# Patient Record
Sex: MALE | Race: WHITE | Employment: OTHER | URBAN - METROPOLITAN AREA
[De-identification: names, ages, dates, MRNs, and addresses within clinical notes are randomized per-mention and may not be internally consistent; named-entity substitution may affect disease eponyms.]

---

## 2017-02-14 RX ORDER — LISINOPRIL AND HYDROCHLOROTHIAZIDE 10; 12.5 MG/1; MG/1
TABLET ORAL
Qty: 90 TAB | Refills: 1 | Status: SHIPPED | OUTPATIENT
Start: 2017-02-14 | End: 2017-07-10 | Stop reason: SDUPTHER

## 2017-02-14 RX ORDER — DICLOFENAC SODIUM 75 MG/1
TABLET, DELAYED RELEASE ORAL
Qty: 180 TAB | Refills: 5 | Status: SHIPPED | OUTPATIENT
Start: 2017-02-14 | End: 2017-07-10 | Stop reason: SDUPTHER

## 2017-05-11 ENCOUNTER — OFFICE VISIT (OUTPATIENT)
Dept: FAMILY MEDICINE CLINIC | Age: 72
End: 2017-05-11

## 2017-05-11 VITALS
HEART RATE: 65 BPM | SYSTOLIC BLOOD PRESSURE: 120 MMHG | DIASTOLIC BLOOD PRESSURE: 76 MMHG | OXYGEN SATURATION: 97 % | HEIGHT: 70 IN | RESPIRATION RATE: 16 BRPM | TEMPERATURE: 97.9 F | WEIGHT: 200.4 LBS | BODY MASS INDEX: 28.69 KG/M2

## 2017-05-11 DIAGNOSIS — L98.9 SKIN LESION: ICD-10-CM

## 2017-05-11 DIAGNOSIS — I10 ESSENTIAL HYPERTENSION, BENIGN: Primary | ICD-10-CM

## 2017-05-11 DIAGNOSIS — M10.9 GOUT, UNSPECIFIED CAUSE, UNSPECIFIED CHRONICITY, UNSPECIFIED SITE: ICD-10-CM

## 2017-05-11 RX ORDER — ALLOPURINOL 100 MG/1
100 TABLET ORAL DAILY
Qty: 90 TAB | Refills: 3 | Status: SHIPPED | OUTPATIENT
Start: 2017-05-11 | End: 2018-05-22 | Stop reason: SDUPTHER

## 2017-05-11 NOTE — MR AVS SNAPSHOT
Visit Information Date & Time Provider Department Dept. Phone Encounter #  
 5/11/2017  3:00 PM Haja Nowak MD [facility] [phone] [id number] Follow-up Instructions Return if symptoms worsen or fail to improve. Upcoming Health Maintenance Date Due DTaP/Tdap/Td series (1 - Tdap) 5/4/1965 GLAUCOMA SCREENING Q2Y 10/16/2016 INFLUENZA AGE 9 TO ADULT 8/1/2017 MEDICARE YEARLY EXAM 8/31/2017 COLONOSCOPY 8/10/2021 Allergies as of 5/11/2017  Review Complete On: 5/11/2017 By: Haja Nowak MD  
 No Known Allergies Current Immunizations  Reviewed on 8/30/2016 Name Date Influenza Vaccine 9/25/2013 Pneumococcal Conjugate (PCV-13) 4/27/2015 Pneumococcal Vaccine (Unspecified Type) 2/20/2013 Zoster Vaccine, Live 2/20/2013 Not reviewed this visit You Were Diagnosed With   
  
 Codes Comments Essential hypertension, benign    -  Primary ICD-10-CM: I10 
ICD-9-CM: 401.1 Gout, unspecified cause, unspecified chronicity, unspecified site     ICD-10-CM: M10.9 ICD-9-CM: 274.9 Skin lesion     ICD-10-CM: L98.9 ICD-9-CM: 709.9 Vitals BP Pulse Temp Resp Height(growth percentile) Weight(growth percentile) 120/76 65 97.9 °F (36.6 °C) (Oral) 16 5' 10\" (1.778 m) 200 lb 6.4 oz (90.9 kg) SpO2 BMI Smoking Status 97% 28.75 kg/m2 Never Smoker Vitals History BMI and BSA Data Body Mass Index Body Surface Area 28.75 kg/m 2 2.12 m 2 Preferred Pharmacy Pharmacy Name Phone [address] [facility] [address] [place] [address] [phone] Your Updated Medication List  
  
   
This list is accurate as of: 5/11/17  3:53 PM.  Always use your most recent med list.  
  
  
  
  
 ALEVE 220 mg Cap Generic drug:  naproxen sodium Take  by mouth. allopurinol 100 mg tablet Commonly known as:  Perez Leisure Take 1 Tab by mouth daily. aspirin 81 mg chewable tablet Take 81 mg by mouth daily. diclofenac EC 75 mg EC tablet Commonly known as:  VOLTAREN  
TAKE 1 TABLET TWICE DAILY FOLIC ACID PO Take  by mouth. Qjomoftp-Zpjv-Tbfdqp-Hyalur Ac 664-264-75-2 mg Cap Take  by mouth daily. lisinopril-hydroCHLOROthiazide 10-12.5 mg per tablet Commonly known as:  PRINZIDE, ZESTORETIC  
TAKE 1 TABLET EVERY DAY Prescriptions Sent to Pharmacy Refills  
 allopurinol (ZYLOPRIM) 100 mg tablet 3 Sig: Take 1 Tab by mouth daily. Class: Normal  
 Pharmacy: [facility], [address]  #: [phone] Route: Oral  
  
We Performed the Following REFERRAL TO DERMATOLOGY [REF19 Custom] Follow-up Instructions Return if symptoms worsen or fail to improve. Referral Information Referral ID Referred By Referred To  
  
 [id number] Denice GILMORE NP   
   [address] Phone: [phone] Fax: [phone] Visits Status Start Date End Date 1 New Request 5/11/17 5/11/18 If your referral has a status of pending review or denied, additional information will be sent to support the outcome of this decision. Introducing [facility]! Dear Hannah Laura: Thank you for requesting a [employer] account. Our records indicate that you already have an active [employer] account. You can access your account anytime at [email or web address] Did you know that you can access your hospital and ER discharge instructions at any time in [employer]? You can also review all of your test results from your hospital stay or ER visit. Additional Information If you have questions, please visit the Frequently Asked Questions section of the [employer] website at [email or web address]. Remember, [employer] is NOT to be used for urgent needs.  For medical emergencies, dial 911. Now available from your iPhone and Android! Please provide this summary of care documentation to your next provider. Your primary care clinician is listed as TOMMIE GILMORE. If you have any questions after today's visit, please call [phone].

## 2017-05-11 NOTE — PROGRESS NOTES
Chief Complaint   Patient presents with    Medication Refill     Medication pending    Skin Problem     c/o of several areas located on body patient sun tellez physician to evaluate     1. Have you been to the ER, urgent care clinic since your last visit? Hospitalized since your last visit? No    2. Have you seen or consulted any other health care providers outside of the [address] [place] [address] since your last visit? Include any pap smears or colon screening. No      Chief Complaint   Patient presents with    Medication Refill     Medication pending    Skin Problem     c/o of several areas located on body patient sun tellez physician to evaluate     he is a 67y.o. year old male who presents for evalution. Reviewed PmHx, RxHx, FmHx, SocHx, AllgHx and updated and dated in the chart. Patient Active Problem List    Diagnosis    Advance care planning     Has LW      Male erectile dysfunction    Essential hypertension, benign    Gout       Review of Systems - negative except as listed above in the HPI    Objective:     Vitals:    05/11/17 1542   BP: 120/76   Pulse: 65   Resp: 16   Temp: 97.9 °F (36.6 °C)   TempSrc: Oral   SpO2: 97%   Weight: 200 lb 6.4 oz (90.9 kg)   Height: 5' 10\" (1.778 m)     Physical Examination: General appearance - alert, well appearing, and in no distress  Skin - r shoulder with 1 cm lesion with rolled edges and open sore  Back with several AK's    Assessment/ Plan:   Alfred Spear was seen today for medication refill and skin problem. Diagnoses and all orders for this visit:    Essential hypertension, benign  -at goal    Gout, unspecified cause, unspecified chronicity, unspecified site  -     allopurinol (ZYLOPRIM) 100 mg tablet; Take 1 Tab by mouth daily. Skin lesion  -     REFERRAL TO DERMATOLOGY       Follow-up Disposition:  Return if symptoms worsen or fail to improve. I have discussed the diagnosis with the patient and the intended plan as seen in the above orders.   The patient understands and agrees with the plan. The patient has received an after-visit summary and questions were answered concerning future plans. Medication Side Effects and Warnings were discussed with patient  Patient Labs were reviewed and or requested:  Patient Past Records were reviewed and or requested    Aj Cui M.D. There are no Patient Instructions on file for this visit.

## 2017-06-19 ENCOUNTER — OFFICE VISIT (OUTPATIENT)
Dept: DERMATOLOGY | Facility: AMBULATORY SURGERY CENTER | Age: 72
End: 2017-06-19

## 2017-06-19 ENCOUNTER — HOSPITAL ENCOUNTER (OUTPATIENT)
Dept: LAB | Age: 72
Discharge: HOME | End: 2017-06-19

## 2017-06-19 VITALS
SYSTOLIC BLOOD PRESSURE: 150 MMHG | HEART RATE: 58 BPM | HEIGHT: 70 IN | TEMPERATURE: 97.9 F | BODY MASS INDEX: 27.92 KG/M2 | OXYGEN SATURATION: 99 % | DIASTOLIC BLOOD PRESSURE: 90 MMHG | WEIGHT: 195 LBS

## 2017-06-19 DIAGNOSIS — L82.1 SEBORRHEIC KERATOSES: ICD-10-CM

## 2017-06-19 DIAGNOSIS — D48.5 NEOPLASM OF UNCERTAIN BEHAVIOR OF SKIN OF BACK: ICD-10-CM

## 2017-06-19 DIAGNOSIS — L57.0 ACTINIC KERATOSIS: ICD-10-CM

## 2017-06-19 DIAGNOSIS — L90.5 SCAR CONDITION AND FIBROSIS OF SKIN: ICD-10-CM

## 2017-06-19 DIAGNOSIS — L81.4 LENTIGINES: ICD-10-CM

## 2017-06-19 DIAGNOSIS — D48.5 NEOPLASM OF UNCERTAIN BEHAVIOR OF SKIN OF SHOULDER: Primary | ICD-10-CM

## 2017-06-19 DIAGNOSIS — D48.5 NEOPLASM OF UNCERTAIN BEHAVIOR OF SKIN OF SHOULDER: ICD-10-CM

## 2017-06-19 DIAGNOSIS — D18.01 CHERRY ANGIOMA: ICD-10-CM

## 2017-06-19 DIAGNOSIS — D22.9 MULTIPLE BENIGN NEVI: ICD-10-CM

## 2017-06-19 DIAGNOSIS — Z85.828 HISTORY OF NONMELANOMA SKIN CANCER: ICD-10-CM

## 2017-06-19 PROCEDURE — 88305 TISSUE EXAM BY PATHOLOGIST: CPT | Performed by: NURSE PRACTITIONER

## 2017-06-19 NOTE — PROGRESS NOTES
Name: Sasha Mason       Age: 67 y.o. Date: 6/19/2017    Chief Complaint:   Chief Complaint   Patient presents with    Skin Exam       Subjective:    HPI  Mr. Sasha Mason is a 67 y.o. male who presents as a new patient to [place] [address] for a skin exam.  The patient was referred for this evaluation by Dr. Edna Matias. The patient has had a skin exam in the past (approximately 4-5 yrs ago was his last) and the patient does have current complaints related to his skin. He reports a lesion that will not go away on the right shoulder. He also reports his wife and Dr. Edna Matias have noted lesions on his back. He reports a history of outdoor work and recreation. He reports a history of 8-10 skin cancers treated in the past.  Mr. Sasha Mason is feeling well and in his usual state of health today. The patient's pertinent skin history includes: NMSC treated in the past - non facial he reports. Sun exposure with some sun screen use and now wears a hat. ROS: Constitutional: Negative. Dermatological : positive for - skin lesion changes      Social History     Social History    Marital status: SINGLE     Spouse name: N/A    Number of children: N/A    Years of education: N/A     Occupational History    Not on file. Social History Main Topics    Smoking status: Never Smoker    Smokeless tobacco: Never Used    Alcohol use 1.0 oz/week     1 Glasses of wine, 1 Cans of beer per week      Comment: 2-3 times a week    Drug use: No    Sexual activity: Not on file     Other Topics Concern    Not on file     Social History Narrative       No family history on file.     Past Medical History:   Diagnosis Date    Arthritis     gout    Family history of skin cancer     Gout 11/13/2013    Hypertension     Radiation exposure     Skin cancer     Sun-damaged skin        Past Surgical History:   Procedure Laterality Date    COLONOSCOPY  11/20/2014         HX COLONOSCOPY  2006 2015 1 polyp,     HX HEENT      oral surgery       Current Outpatient Prescriptions   Medication Sig Dispense Refill    allopurinol (ZYLOPRIM) 100 mg tablet Take 1 Tab by mouth daily. 90 Tab 3    diclofenac EC (VOLTAREN) 75 mg EC tablet TAKE 1 TABLET TWICE DAILY 180 Tab 5    lisinopril-hydroCHLOROthiazide (PRINZIDE, ZESTORETIC) 10-12.5 mg per tablet TAKE 1 TABLET EVERY DAY 90 Tab 1    naproxen sodium (ALEVE) 220 mg cap Take  by mouth.  FOLIC ACID PO Take  by mouth.  Ouaccodi-Edaa-Aqmgaj-Hyalur Ac [id number] mg cap Take  by mouth daily.  aspirin 81 mg chewable tablet Take 81 mg by mouth daily. No Known Allergies      Objective:    Visit Vitals    /90 (BP 1 Location: Left arm, BP Patient Position: Sitting)    Pulse (!) 58    Temp 97.9 °F (36.6 °C) (Oral)    Ht 5' 10\" (1.778 m)    Wt 88.5 kg (195 lb)    SpO2 99%    BMI 27.98 kg/m2       Jg Elaine is a 67 y.o. male who appears well and in no distress. He is awake, alert, and oriented. There is no preauricular, submandibular, or cervical lymphadenopathy. A skin examination was performed including his scalp, face (including eyelid), ears, neck, chest, back, abdomen, upper extremities (including digits/nails), lower extremities (mid thigh to toes), breasts ; buttocks and genital skin was not examined. He has lentigines on sun exposed areas and tanned skin on the back of the neck and forearms. There are old acne scars. There is a thin scaled Ak on the right vertex scalp and left nasal dorsum. There are scars noted on the anterior neck and left chest without evidence of lesion recurrence.  There are multiple areas on the shoulders and back concerning for new areas of basal cell carcinoma - a pearly papule 20 x 9 mm on the right shoulder with telangiectasias, a pink scar like area on the right lower back 18 x 9 mm, a pink shiny macule 11 x 8 mm on the mid upper back, 7 x 7 mm on the mid lower back, 9 x 8 mm on the left posterior shoulder, and 9 x 6 mm on the left upper back. Waxy macules and keratotic papules consistent with seborrheic keratoses, non inflamed, as well as cherry angiomas are noted. There are scattered medium brown junctional nevi and intradermal nevi are without concerning features. Assessment/Plan:  1. Neoplasm of Uncertain Behavior, right shoulder, favor BCC. The differential diagnoses were discussed. A shave biopsy was advised to sample this lesion. The procedure was reviewed and verbal and written consent were obtained. The risks of pain, bleeding, infection, and scar were discussed. The patient is aware that this is a sample and is intended for diagnosis and not therapy of the skin lesion. I performed the procedure. The site was cleansed and anesthetized with 1% Lidocaine with Epinephrine 1:100,000. A shave biopsy was performed to sample the lesion. Drysol was used for hemostasis. The wound was bandaged and care reviewed. The specimen was sent to pathology. I will contact the patient with the results and any further treatment that may be necessary. 2. Neoplasm of Uncertain Behavior, right lower back favor BCC. The differential diagnoses were discussed. A shave biopsy was advised to sample this lesion. The procedure was reviewed and verbal and written consent were obtained. The risks of pain, bleeding, infection, and scar were discussed. The patient is aware that this is a sample and is intended for diagnosis and not therapy of the skin lesion. I performed the procedure. The site was cleansed and anesthetized with 1% Lidocaine with Epinephrine 1:100,000. A shave biopsy was performed to sample the lesion. Drysol was used for hemostasis. The wound was bandaged and care reviewed. The specimen was sent to pathology. I will contact the patient with the results and any further treatment that may be necessary. 3. Neoplasm of Uncertain Behavior, mid upper back, favor BCC.   The differential diagnoses were discussed. A shave biopsy followed by curettage was advised to address this lesion with malignant destruction. The procedure was reviewed and verbal and written consent were obtained. The risks of pain, bleeding, infection, scar, and recurrence of the lesion were discussed. I performed the procedure. The site was cleansed and anesthetized with 1% Lidocaine with Epinephrine 1:100,000. Curettage was performed by me to include a 2 mm margin, resulting in a post curettage defect size of 15 x 12 mm. Drysol was used for hemostasis. The wound was bandaged and care reviewed. The specimen was sent to pathology. I will contact the patient with the results and any further treatment that may be necessary. 4. Neoplasm of Uncertain Behavior, mid lower back, favor BCC. The differential diagnoses were discussed. A shave biopsy followed by curettage was advised to address this lesion with malignant destruction. The procedure was reviewed and verbal and written consent were obtained. The risks of pain, bleeding, infection, scar, and recurrence of the lesion were discussed. I performed the procedure. The site was cleansed and anesthetized with 1% Lidocaine with Epinephrine 1:100,000. Curettage was performed by me to include a 2 mm margin, resulting in a post curettage defect size of 11 x 11 mm. Drysol was used for hemostasis. The wound was bandaged and care reviewed. The specimen was sent to pathology. I will contact the patient with the results and any further treatment that may be necessary. 5. Neoplasm of Uncertain Behavior, left posterior shoulder, favor BCC. The differential diagnoses were discussed. A shave biopsy followed by curettage was advised to address this lesion with malignant destruction. The procedure was reviewed and verbal and written consent were obtained. The risks of pain, bleeding, infection, scar, and recurrence of the lesion were discussed.   I performed the procedure. The site was cleansed and anesthetized with 1% Lidocaine with Epinephrine 1:100,000. Curettage was performed by me to include a 2 mm margin, resulting in a post curettage defect size of 13 x 12 mm. Drysol was used for hemostasis. The wound was bandaged and care reviewed. The specimen was sent to pathology. I will contact the patient with the results and any further treatment that may be necessary. 6. Neoplasm of Uncertain Behavior, left upper back, favor BCC. The differential diagnoses were discussed. A shave biopsy followed by curettage was advised to address this lesion with malignant destruction. The procedure was reviewed and verbal and written consent were obtained. The risks of pain, bleeding, infection, scar, and recurrence of the lesion were discussed. I performed the procedure. The site was cleansed and anesthetized with 1% Lidocaine with Epinephrine 1:100,000. Curettage was performed by me to include a 2 mm margin, resulting in a post curettage defect size of 13 x 10 mm. Drysol was used for hemostasis. The wound was bandaged and care reviewed. The specimen was sent to pathology. I will contact the patient with the results and any further treatment that may be necessary. 7. Seborrheic keratoses. The diagnosis was reviewed and the patient was reassured that no treatment is needed for these benign lesions. 8. Normal nevi. The diagnosis of normal nevi was reviewed. I discussed sun protection, sunscreen use, the warning signs of skin cancer, the need for self-skin examinations, and the need for regular practitioner exams every 6 months. The patient should follow up sooner as needed if new, changing, or symptomatic skin lesions arise. 9. Cherry angiomas. The diagnosis was reviewed and the patient was reassured that no treatment is needed for these benign lesions. 10 . Solar lentigos. The diagnosis and relationship to sun exposure was reviewed.   Latricia Hwang protection advised. 11. Actinic Keratoses. The diagnosis of this precancerous lesion related to sun exposure was reviewed. Verbal consent was obtained. I treated 2 lesions with cryotherapy and post-cryotherapy care was reviewed. 12. Personal history of skin cancer. I discussed sun protection, sunscreen use, the warning signs of skin cancer, the need for self-skin examinations, and the need for regular practitioner exams every 6 months. The patient should follow up sooner as needed if new, changing, or symptomatic skin lesions arise. [facility]   OFFICE PROCEDURE PROGRESS NOTE   Chart reviewed for the following:   Morgan LOPEZ, have reviewed the History, Physical and updated the Allergic reactions for Roselind Redder. TIME OUT performed immediately prior to start of procedure:   Clau LOPEZ, [address], have performed the following reviews on Roselind Redder   prior to the start of the procedure:     * Patient was identified by name and date of birth   * Agreement on procedure being performed was verified   * Risks and Benefits explained to the patient   * Procedure site verified and marked as necessary   * Patient was positioned for comfort   * Consent was signed and verified     Time: 7554  Date of procedure: 6/19/2017  Procedure performed by: Che Do  Provider assisted by: lpn   Patient assisted by: self   How tolerated by patient: tolerated the procedure well with no complications   Comments: none

## 2017-06-22 NOTE — PROGRESS NOTES
I notified the patient of the positive results of basal cell carcinoma and all lesions that were biopsied. The lesions were treated with curettage except for the right lower back and right shoulder which will be referred for excision. He understands and states he is doing well. He is ready to schedule these excisions with Dr. Shyanne Lucero.

## 2017-07-11 RX ORDER — DICLOFENAC SODIUM 75 MG/1
TABLET, DELAYED RELEASE ORAL
Qty: 180 TAB | Refills: 5 | Status: SHIPPED | OUTPATIENT
Start: 2017-07-11 | End: 2019-12-17 | Stop reason: ALTCHOICE

## 2017-07-11 RX ORDER — LISINOPRIL AND HYDROCHLOROTHIAZIDE 10; 12.5 MG/1; MG/1
TABLET ORAL
Qty: 90 TAB | Refills: 1 | Status: SHIPPED | OUTPATIENT
Start: 2017-07-11 | End: 2018-05-22 | Stop reason: SDUPTHER

## 2017-08-21 ENCOUNTER — HOSPITAL ENCOUNTER (OUTPATIENT)
Dept: LAB | Age: 72
Discharge: HOME | End: 2017-08-21

## 2017-08-21 ENCOUNTER — OFFICE VISIT (OUTPATIENT)
Dept: DERMATOLOGY | Facility: AMBULATORY SURGERY CENTER | Age: 72
End: 2017-08-21

## 2017-08-21 VITALS
OXYGEN SATURATION: 97 % | HEIGHT: 70 IN | DIASTOLIC BLOOD PRESSURE: 82 MMHG | HEART RATE: 67 BPM | WEIGHT: 195 LBS | TEMPERATURE: 97.6 F | BODY MASS INDEX: 27.92 KG/M2 | SYSTOLIC BLOOD PRESSURE: 118 MMHG

## 2017-08-21 DIAGNOSIS — C44.519 BASAL CELL CARCINOMA OF BACK: ICD-10-CM

## 2017-08-21 DIAGNOSIS — C44.612 BASAL CELL CARCINOMA OF RIGHT SHOULDER: Primary | ICD-10-CM

## 2017-08-21 NOTE — PROGRESS NOTES
Written by Lubna Candelaria, as dictated by Dr. Warden Leonard. Homer Saucedo MD.    CC: Basal cell carcinomas on the right shoulder and right mid back    History of Present Illness:    Toni Iglesias is a 67 y.o. male referred by Collette Salk, [address]. He has two biopsy-proven basal cell carcinomas on on the right shoulder and right mid back. He has an infiltrative and superficial basal cell carcinoma on his right shoulder and an ulcerated and nodular basal cell carcinoma on his right mid back. The basal cell carcinoma on his right mid back was previously designated as the right lower back. These are new basal cell carcinomas present for more than six months with no prior treatment. Biopsies confirmed the diagnoses of basal cell carcinomas, and I reviewed the written pathology. He is feeling well and in his usual state of health today. He has no pain, no current illnesses, no other skin concerns. His allergies, medications, medical, and social history are reviewed by me today. Exam:    He is an awake, alert, and oriented 67 y.o. male who appears well and in no distress.  I examined his right shoulder and back. He has a 22 x 9 mm pearly plaque on his right shoulder. He has a 21 x 10 mm pink plaque with central biopsy scar on his right mid back. He confirms both locations. He has well healed surgical sites where 4 other BCCs were recently curetted. Assessment/Plan:    1. Basal cell carcinoma, right shoulder.     Excision was advised for removal and therapy of this 22 x 9 mm lesion on the right shoulder.  The excision procedure was reviewed and verbal and written consents were obtained.  The risks of pain, bleeding, infection, recurrence of the lesion, and scar were discussed.  I performed the procedure.  The site was cleansed and anesthetized with 1% lidocaine with epinephrine 1:100,000.  The lesion was excised with a 3 mm margin in an elliptical manner to a depth of subcutaneous tissue.  Hemostasis was obtained with electrosurgery and pressure. An intermediate repair was performed after the excision. 4-0 polysorb suture was used for approximation of deep tissues and a second layer of 4-0 polysorb suture was used to approximate the skin edges. The closure length was 42 mm.  The wound was bandaged and care reviewed.  The specimen was sent to pathology. I will contact the patient with the results and any further treatment that may be necessary. 2. Basal cell carcinoma, right mid back. Excision was advised for removal and therapy of this 21 x 10 mm lesion on the right mid back.  The excision procedure was reviewed and verbal and written consents were obtained.  The risks of pain, bleeding, infection, recurrence of the lesion, and scar were discussed.  I performed the procedure.  The site was cleansed and anesthetized with 1% lidocaine with epinephrine 1:100,000.  The lesion was excised with a 3 mm margin in an elliptical manner to a depth of subcutaneous tissue.  Hemostasis was obtained with electrosurgery and pressure. An intermediate was performed after the excision. 4-0 polysorb suture was used for approximation of deep tissues and a second layer of 4-0 polysorb suture was used to approximate the skin edges. The closure length was 43 mm.  The wound was bandaged and care reviewed.  The specimen was sent to pathology. I will contact the patient with the results and any further treatment that may be necessary. The documentation recorded by the scribe accurately reflects the service I personally performed and the decisions made by me. [facility]   OFFICE PROCEDURE PROGRESS NOTE     Chart reviewed for the following:     Ludmila Schneider MD, have reviewed the History, Physical and updated the Allergic reactions for [address] performed immediately prior to start of procedure:     Ludmila Schneider MD, have performed the following reviews on Paloma Walls prior to the start of the procedure:     * Patient was identified by name and date of birth   * Agreement on procedure being performed was verified   * Risks and Benefits explained to the patient   * Procedure site verified and marked as necessary   * Patient was positioned for comfort   * Consent was signed and verified     Time: 3:50 PM   Date of procedure: 8/21/2017  Procedure performed by: Chasity Markham.  Missy Crouch MD   Provider assisted by: LPN   Patient assisted by: self   How tolerated by patient: tolerated the procedure well with no complications   Comments: none

## 2017-08-21 NOTE — MR AVS SNAPSHOT
Visit Information Date & Time Provider Department Dept. Phone Encounter #  
 8/21/2017  3:30 PM Becki Mayberry MD [facility] [phone] [id number] Upcoming Health Maintenance Date Due DTaP/Tdap/Td series (1 - Tdap) 5/4/1965 GLAUCOMA SCREENING Q2Y 10/16/2016 INFLUENZA AGE 9 TO ADULT 8/1/2017 MEDICARE YEARLY EXAM 8/31/2017 COLONOSCOPY 8/10/2021 Allergies as of 8/21/2017  Review Complete On: 8/21/2017 By: Becki Mayberry MD  
 No Known Allergies Current Immunizations  Reviewed on 8/30/2016 Name Date Influenza Vaccine 9/25/2013 Pneumococcal Conjugate (PCV-13) 4/27/2015 Pneumococcal Vaccine (Unspecified Type) 2/20/2013 Zoster Vaccine, Live 2/20/2013 Not reviewed this visit Vitals BP Pulse Temp Height(growth percentile) Weight(growth percentile) SpO2  
 118/82 (BP 1 Location: Left arm, BP Patient Position: Sitting) 67 97.6 °F (36.4 °C) (Oral) 5' 10\" (1.778 m) 195 lb (88.5 kg) 97% BMI Smoking Status 27.98 kg/m2 Never Smoker Vitals History BMI and BSA Data Body Mass Index Body Surface Area  
 27.98 kg/m 2 2.09 m 2 Preferred Pharmacy Pharmacy Name Phone [facility] [address] [facility] [address] [phone] Your Updated Medication List  
  
   
This list is accurate as of: 8/21/17  3:38 PM.  Always use your most recent med list.  
  
  
  
  
 ALEVE 220 mg Cap Generic drug:  naproxen sodium Take  by mouth. allopurinol 100 mg tablet Commonly known as:  Chary Rizwana Take 1 Tab by mouth daily. aspirin 81 mg chewable tablet Take 81 mg by mouth daily. diclofenac EC 75 mg EC tablet Commonly known as:  VOLTAREN  
TAKE 1 TABLET TWICE DAILY FOLIC ACID PO Take  by mouth. Ttfzqeco-Rvto-Iilvxv-Hyalur Ac 081-542-87-2 mg Cap Take  by mouth daily. lisinopril-hydroCHLOROthiazide 10-12.5 mg per tablet Commonly known as:  PRINZIDE, ZESTORETIC  
TAKE 1 TABLET EVERY DAY Patient Instructions WOUND CARE INSTRUCTIONS 1. Keep the dressing clean and dry and do not remove for 48 hours. 2. Then change the dressing once a day as follows: 
a. Wash hands before and after each dressing change. b. Remove dressing and wash site gently with mild soap and water, rinse, and pat dry. 
c. Apply an ointment (Bacitracin, Polysporin, Neosporin, Petroleum jelly or Aquaphor). d. Apply a non-stick (Telfa) dressing or Band-Aid to cover the wound. 3. Watch for: BLEEDING: A small amount of drainage may occur. If bleeding occurs, elevate and rest the surgery site. Apply gauze and steady pressure for 15 minutes. If bleeding continues, call this office. INFECTION: Signs of infection include increased redness, pain, warmth, drainage of pus, and fever. If this occurs, call this office. 4. Special Instructions (follow any that are checked): ·  You have stitches that need to be removed in 0 days ·  Avoid bending at the waist and heavy lifting for two days. ·  Sleep with your head elevated for the next two nights. ·  Rest the surgery site and keep it elevated as much as possible for two days. ·  You may apply an ice-pack for 10-15 minutes every waking hour for the rest of the day. ·  Eat a soft diet and avoid hot food and hot drinks for the rest of the day. ·  Other instructions: Follow up as directed Take Tylenol for pain as needed. Once the site is healed with no remaining bandages or open areas, protect your surgical site and scar from the sun, as this area will be more sensitive. Use a broad spectrum sunscreen SPF 30 or higher daily, and a chemical free product (one containing zinc oxide or titanium dioxide) is a good choice if the area is sensitive.  
 
You may begin to gently massage the surgical site in 2-3 weeks, rubbing in a circular motion along the scar. This can help reduce swelling and thickness of a scar. A scar cream may be used beginnning 1 month after the surgery. If you have any questions or concerns, please call our office Monday through Friday at [phone]. Introducing [facility] SERVICES! Dear Danika Narayan: Thank you for requesting a LOCK8 account. Our records indicate that you already have an active LOCK8 account. You can access your account anytime at [email or web address] Did you know that you can access your hospital and ER discharge instructions at any time in LOCK8? You can also review all of your test results from your hospital stay or ER visit. Additional Information If you have questions, please visit the Frequently Asked Questions section of the LOCK8 website at [email or web address] Remember, LOCK8 is NOT to be used for urgent needs. For medical emergencies, dial 911. Now available from your iPhone and Android! Please provide this summary of care documentation to your next provider. Your primary care clinician is listed as TOMMIE GILMORE. If you have any questions after today's visit, please call [phone].

## 2017-08-21 NOTE — PATIENT INSTRUCTIONS
WOUND CARE INSTRUCTIONS    1. Keep the dressing clean and dry and do not remove for 48 hours. 2. Then change the dressing once a day as follows:  a. Wash hands before and after each dressing change. b. Remove dressing and wash site gently with mild soap and water, rinse, and pat dry.  c. Apply an ointment (Bacitracin, Polysporin, Neosporin, Petroleum jelly or Aquaphor). d. Apply a non-stick (Telfa) dressing or Band-Aid to cover the wound. 3. Watch for:  BLEEDING: A small amount of drainage may occur. If bleeding occurs, elevate and rest the surgery site. Apply gauze and steady pressure for 15 minutes. If bleeding continues, call this office. INFECTION: Signs of infection include increased redness, pain, warmth, drainage of pus, and fever. If this occurs, call this office. 4. Special Instructions (follow any that are checked):  · [] You have stitches that need to be removed in 0 days  · [x] Avoid bending at the waist and heavy lifting for two days. · [x] Sleep with your head elevated for the next two nights. · [x] Rest the surgery site and keep it elevated as much as possible for two days. · [x] You may apply an ice-pack for 10-15 minutes every waking hour for the rest of the day. · [] Eat a soft diet and avoid hot food and hot drinks for the rest of the day. · [] Other instructions: Follow up as directed  Take Tylenol for pain as needed. Once the site is healed with no remaining bandages or open areas, protect your surgical site and scar from the sun, as this area will be more sensitive. Use a broad spectrum sunscreen SPF 30 or higher daily, and a chemical free product (one containing zinc oxide or titanium dioxide) is a good choice if the area is sensitive. You may begin to gently massage the surgical site in 2-3 weeks, rubbing in a circular motion along the scar. This can help reduce swelling and thickness of a scar. A scar cream may be used beginnning 1 month after the surgery. If you have any questions or concerns, please call our office Monday through Friday at [phone].

## 2017-08-25 ENCOUNTER — TELEPHONE (OUTPATIENT)
Dept: DERMATOLOGY | Facility: AMBULATORY SURGERY CENTER | Age: 72
End: 2017-08-25

## 2017-09-14 ENCOUNTER — OFFICE VISIT (OUTPATIENT)
Dept: FAMILY MEDICINE CLINIC | Age: 72
End: 2017-09-14

## 2017-09-14 VITALS
HEIGHT: 70 IN | TEMPERATURE: 97.9 F | DIASTOLIC BLOOD PRESSURE: 82 MMHG | OXYGEN SATURATION: 97 % | RESPIRATION RATE: 20 BRPM | WEIGHT: 199 LBS | HEART RATE: 64 BPM | BODY MASS INDEX: 28.49 KG/M2 | SYSTOLIC BLOOD PRESSURE: 147 MMHG

## 2017-09-14 DIAGNOSIS — Z71.89 ADVANCE CARE PLANNING: ICD-10-CM

## 2017-09-14 DIAGNOSIS — Z00.00 ROUTINE GENERAL MEDICAL EXAMINATION AT A HEALTH CARE FACILITY: Primary | ICD-10-CM

## 2017-09-14 DIAGNOSIS — Z23 ENCOUNTER FOR IMMUNIZATION: ICD-10-CM

## 2017-09-14 DIAGNOSIS — M10.9 GOUT, UNSPECIFIED CAUSE, UNSPECIFIED CHRONICITY, UNSPECIFIED SITE: ICD-10-CM

## 2017-09-14 DIAGNOSIS — I10 ESSENTIAL HYPERTENSION, BENIGN: ICD-10-CM

## 2017-09-14 DIAGNOSIS — Z12.5 PROSTATE CANCER SCREENING: ICD-10-CM

## 2017-09-14 RX ORDER — INDOMETHACIN 50 MG/1
CAPSULE ORAL 3 TIMES DAILY
COMMUNITY
End: 2017-09-14 | Stop reason: SDUPTHER

## 2017-09-14 RX ORDER — INDOMETHACIN 50 MG/1
50 CAPSULE ORAL 3 TIMES DAILY
Qty: 90 CAP | Refills: 2 | Status: SHIPPED | OUTPATIENT
Start: 2017-09-14 | End: 2018-08-06

## 2017-09-14 NOTE — PROGRESS NOTES
Patient here for wellness visit. Needs a flu shot. Med refill. 1. Have you been to the ER, urgent care clinic since your last visit? Hospitalized since your last visit? No    2. Have you seen or consulted any other health care providers outside of the [address] [place] [address] since your last visit? Include any pap smears or colon screening. No       Medicare Wellness Exam:    Chief Complaint   Patient presents with    Annual Wellness Visit    Immunization/Injection     flu shot     he is a 67y.o. year old male who presents for evaluation for their Medicare Wellness Visit. Alicia Luther is completed and assessed=yes  Depression Screen is completed and assessed=yes  Medication list reviewed and adjusted for accuracy=yes  Immunizations reviewed and updated=yes  Health/Preventative Screenings reviewed and updated=yes  ADL Functions reviewed=yes    Patient Active Problem List    Diagnosis    Advance care planning     Has LW      Male erectile dysfunction    Essential hypertension, benign    Gout       Reviewed PmHx, RxHx, FmHx, SocHx, AllgHx and updated and dated in the chart. Review of Systems - negative except as listed above in the HPI    Objective:     Vitals:    09/14/17 1447   BP: 147/82   Pulse: 64   Resp: 20   Temp: 97.9 °F (36.6 °C)   SpO2: 97%   Weight: 199 lb (90.3 kg)   Height: 5' 10\" (1.778 m)     Physical Examination: General appearance - alert, well appearing, and in no distress  Neck - supple, no significant adenopathy  Chest - clear to auscultation, no wheezes, rales or rhonchi, symmetric air entry  Heart - normal rate, regular rhythm, normal S1, S2, no murmurs, rubs, clicks or gallops  Abdomen - soft, nontender, nondistended, no masses or organomegaly  Extremities - peripheral pulses normal, no pedal edema, no clubbing or cyanosis    Assessment/ Plan:   Diagnoses and all orders for this visit:    1.  Routine general medical examination at a health care facility  -     METABOLIC PANEL, COMPREHENSIVE  -     LIPID PANEL  -     CBC WITH AUTOMATED DIFF  -     TSH 3RD GENERATION  -     PROSTATE SPECIFIC AG    2. Encounter for immunization  -     Influenza virus vaccine ([address]) 72 years and older (28547)  -     Administration fee () for Medicare insured patients    3. Advance care planning  -has LW    4. Essential hypertension, benign  -     METABOLIC PANEL, COMPREHENSIVE  -     LIPID PANEL  -better at home    5. Gout, unspecified cause, unspecified chronicity, unspecified site  -     indomethacin (INDOCIN) 50 mg capsule; Take 1 Cap by mouth three (3) times daily. 6. Prostate cancer screening  -     PROSTATE SPECIFIC AG         -Pain evaluation performed in office  -Cognitive Screen performed in office  -Depression Screen, Fall risks (by up and go test)  and ADL functionality were addressed  -Medication list updated and reviewed for any changes   -A comprehensive review of medical issues and a plan was formulated  -End of life planning was addressed with pt   -Health Screenings for preventions were addressed and a plan was formulated  -Shingles Vaccine was recommended  -Discussed with patient cancer risk factors and appropriate screenings for age  -Patient evaluated for colonoscopy and referred if needed per screeing criteria  -Labs from previous visits were discussed with patient   -Discussed with patient diet and exercise and formulated a plan as needed  -An Advanced care plan was developed with the patient.  -Alcohol screening performed and was negative    -Follow-up Disposition: Not on File    I have discussed the diagnosis with the patient and the intended plan as seen in the above orders. The patient understands and agrees with the plan. The patient has received an after-visit summary and questions were answered concerning future plans.      Medication Side Effects and Warnings were discussed with patien  Patient Labs were reviewed and or requested  Patient Past Records were reviewed and or requested    There are no Patient Instructions on file for this visit.       Rosemary Lubin M.D.

## 2017-09-14 NOTE — MR AVS SNAPSHOT
Visit Information Date & Time Provider Department Dept. Phone Encounter #  
 9/14/2017  2:40 PM Manda Cottrell MD [address] [facility] [phone] [id number] Follow-up Instructions Return if symptoms worsen or fail to improve. Your Appointments 3/23/2018 10:30 AM  
Office Visit with EZE Conti 8057 [facility] [facility]) Appt Note: est [facility] [address]  
[address] [place] [address] [address] [facility] [address] Upcoming Health Maintenance Date Due DTaP/Tdap/Td series (1 - Tdap) 5/4/1965 GLAUCOMA SCREENING Q2Y 10/16/2016 INFLUENZA AGE 9 TO ADULT 8/1/2017 MEDICARE YEARLY EXAM 8/31/2017 COLONOSCOPY 8/10/2021 Allergies as of 9/14/2017  Review Complete On: 9/14/2017 By: Manda Cottrell MD  
 No Known Allergies Current Immunizations  Reviewed on 8/30/2016 Name Date Influenza High Dose Vaccine PF  Incomplete Influenza Vaccine 9/25/2013 Pneumococcal Conjugate (PCV-13) 4/27/2015 Pneumococcal Vaccine (Unspecified Type) 2/20/2013 Zoster Vaccine, Live 2/20/2013 Not reviewed this visit You Were Diagnosed With   
  
 Codes Comments Routine general medical examination at a health care facility    -  Primary ICD-10-CM: Z00.00 ICD-9-CM: V70.0 Encounter for immunization     ICD-10-CM: Q92 ICD-9-CM: V03.89 Advance care planning     ICD-10-CM: Z71.89 ICD-9-CM: V65.49 Essential hypertension, benign     ICD-10-CM: I10 
ICD-9-CM: 401.1 Gout, unspecified cause, unspecified chronicity, unspecified site     ICD-10-CM: M10.9 ICD-9-CM: 274.9 Prostate cancer screening     ICD-10-CM: Z12.5 ICD-9-CM: V76.44 Vitals BP Pulse Temp Resp Height(growth percentile) Weight(growth percentile) 147/82 64 97.9 °F (36.6 °C) 20 5' 10\" (1.778 m) 199 lb (90.3 kg) SpO2 BMI Smoking Status 97% 28.55 kg/m2 Never Smoker Vitals History BMI and BSA Data Body Mass Index Body Surface Area 28.55 kg/m 2 2.11 m 2 Preferred Pharmacy Pharmacy Name Phone [facility] [address] [place] [address] - [address] [facility] [address] [phone] Your Updated Medication List  
  
   
This list is accurate as of: 9/14/17  3:11 PM.  Always use your most recent med list.  
  
  
  
  
 ALEVE 220 mg Cap Generic drug:  naproxen sodium Take  by mouth. allopurinol 100 mg tablet Commonly known as:  Evorn Slight Take 1 Tab by mouth daily. aspirin 81 mg chewable tablet Take 81 mg by mouth daily. diclofenac EC 75 mg EC tablet Commonly known as:  VOLTAREN  
TAKE 1 TABLET TWICE DAILY FOLIC ACID PO Take  by mouth. Mrzjntdn-Xtbi-Kmqdro-Hyalur Ac 008-027-77-2 mg Cap Take  by mouth daily. indomethacin 50 mg capsule Commonly known as:  INDOCIN Take 1 Cap by mouth three (3) times daily. lisinopril-hydroCHLOROthiazide 10-12.5 mg per tablet Commonly known as:  PRINZIDE, ZESTORETIC  
TAKE 1 TABLET EVERY DAY Prescriptions Sent to Pharmacy Refills  
 indomethacin (INDOCIN) 50 mg capsule 2 Sig: Take 1 Cap by mouth three (3) times daily. Class: Normal  
 Pharmacy: [address] [place] [address], [facility] Ph #: [phone] Route: Oral  
  
We Performed the Following ADMIN INFLUENZA VIRUS VAC [ HCPCS] CBC WITH AUTOMATED DIFF [22920 CPT(R)] INFLUENZA VIRUS VACCINE, HIGH DOSE SEASONAL, PRESERVATIVE FREE [67491 CPT(R)] LIPID PANEL [39560 CPT(R)] METABOLIC PANEL, COMPREHENSIVE [56255 CPT(R)] PSA, DIAGNOSTIC (PROSTATE SPECIFIC AG) Q582560 CPT(R)] TSH 3RD GENERATION [82312 CPT(R)] Follow-up Instructions Return if symptoms worsen or fail to improve. Introducing [facility] & HEALTH SERVICES! Dear Clem Lay: Thank you for requesting a [employer] account. Our records indicate that you already have an active [employer] account. You can access your account anytime at [email or web address] Did you know that you can access your hospital and ER discharge instructions at any time in [employer]? You can also review all of your test results from your hospital stay or ER visit. Additional Information If you have questions, please visit the Frequently Asked Questions section of the [employer] website at [email or web address] Remember, [employer] is NOT to be used for urgent needs. For medical emergencies, dial 911. Now available from your iPhone and Android! Please provide this summary of care documentation to your next provider. Your primary care clinician is listed as TOMMIE GILMORE. If you have any questions after today's visit, please call [phone].

## 2017-09-14 NOTE — ACP (ADVANCE CARE PLANNING)
I discussed with patient living will and gave a legal form for patient to fill out and bring back to the office.

## 2017-09-16 LAB
ALBUMIN SERPL-MCNC: 4.3 G/DL (ref 3.5–4.8)
ALBUMIN/GLOB SERPL: 1.6 {RATIO} (ref 1.2–2.2)
ALP SERPL-CCNC: 59 IU/L (ref 39–117)
ALT SERPL-CCNC: 22 IU/L (ref 0–44)
AST SERPL-CCNC: 20 IU/L (ref 0–40)
BASOPHILS # BLD AUTO: 0 X10E3/UL (ref 0–0.2)
BASOPHILS NFR BLD AUTO: 0 %
BILIRUB SERPL-MCNC: 0.7 MG/DL (ref 0–1.2)
BUN SERPL-MCNC: 32 MG/DL (ref 8–27)
BUN/CREAT SERPL: 22 (ref 10–24)
CALCIUM SERPL-MCNC: 9.6 MG/DL (ref 8.6–10.2)
CHLORIDE SERPL-SCNC: 103 MMOL/L (ref 96–106)
CHOLEST SERPL-MCNC: 222 MG/DL (ref 100–199)
CO2 SERPL-SCNC: 27 MMOL/L (ref 18–29)
CREAT SERPL-MCNC: 1.46 MG/DL (ref 0.76–1.27)
EOSINOPHIL # BLD AUTO: 0.2 X10E3/UL (ref 0–0.4)
EOSINOPHIL NFR BLD AUTO: 2 %
ERYTHROCYTE [DISTWIDTH] IN BLOOD BY AUTOMATED COUNT: 14.3 % (ref 12.3–15.4)
GLOBULIN SER CALC-MCNC: 2.7 G/DL (ref 1.5–4.5)
GLUCOSE SERPL-MCNC: 87 MG/DL (ref 65–99)
HCT VFR BLD AUTO: 39.9 % (ref 37.5–51)
HDLC SERPL-MCNC: 51 MG/DL
HGB BLD-MCNC: 13.5 G/DL (ref 12.6–17.7)
IMM GRANULOCYTES # BLD: 0 X10E3/UL (ref 0–0.1)
IMM GRANULOCYTES NFR BLD: 0 %
INTERPRETATION, 910389: NORMAL
INTERPRETATION: NORMAL
LDLC SERPL CALC-MCNC: 151 MG/DL (ref 0–99)
LYMPHOCYTES # BLD AUTO: 1.4 X10E3/UL (ref 0.7–3.1)
LYMPHOCYTES NFR BLD AUTO: 19 %
MCH RBC QN AUTO: 32 PG (ref 26.6–33)
MCHC RBC AUTO-ENTMCNC: 33.8 G/DL (ref 31.5–35.7)
MCV RBC AUTO: 95 FL (ref 79–97)
MONOCYTES # BLD AUTO: 0.6 X10E3/UL (ref 0.1–0.9)
MONOCYTES NFR BLD AUTO: 8 %
NEUTROPHILS # BLD AUTO: 5.3 X10E3/UL (ref 1.4–7)
NEUTROPHILS NFR BLD AUTO: 71 %
PDF IMAGE, 910387: NORMAL
PLATELET # BLD AUTO: 240 X10E3/UL (ref 150–379)
POTASSIUM SERPL-SCNC: 5.1 MMOL/L (ref 3.5–5.2)
PROT SERPL-MCNC: 7 G/DL (ref 6–8.5)
PSA SERPL-MCNC: 2.2 NG/ML (ref 0–4)
RBC # BLD AUTO: 4.22 X10E6/UL (ref 4.14–5.8)
SODIUM SERPL-SCNC: 141 MMOL/L (ref 134–144)
TRIGL SERPL-MCNC: 98 MG/DL (ref 0–149)
TSH SERPL DL<=0.005 MIU/L-ACNC: 1.56 UIU/ML (ref 0.45–4.5)
VLDLC SERPL CALC-MCNC: 20 MG/DL (ref 5–40)
WBC # BLD AUTO: 7.5 X10E3/UL (ref 3.4–10.8)

## 2018-01-23 ENCOUNTER — OFFICE VISIT (OUTPATIENT)
Dept: FAMILY MEDICINE CLINIC | Age: 73
End: 2018-01-23

## 2018-01-23 VITALS
RESPIRATION RATE: 20 BRPM | OXYGEN SATURATION: 95 % | HEIGHT: 70 IN | WEIGHT: 200 LBS | TEMPERATURE: 99.5 F | SYSTOLIC BLOOD PRESSURE: 129 MMHG | DIASTOLIC BLOOD PRESSURE: 80 MMHG | HEART RATE: 82 BPM | BODY MASS INDEX: 28.63 KG/M2

## 2018-01-23 DIAGNOSIS — J11.1 INFLUENZA: Primary | ICD-10-CM

## 2018-01-23 DIAGNOSIS — R50.9 FEVER, UNSPECIFIED FEVER CAUSE: ICD-10-CM

## 2018-01-23 DIAGNOSIS — R09.81 NASAL CONGESTION: ICD-10-CM

## 2018-01-23 LAB
FLUAV+FLUBV AG NOSE QL IA.RAPID: NEGATIVE POS/NEG
FLUAV+FLUBV AG NOSE QL IA.RAPID: POSITIVE POS/NEG
VALID INTERNAL CONTROL?: YES

## 2018-01-23 NOTE — PROGRESS NOTES
Patient here for congestion , cold sx. 1. Have you been to the ER, urgent care clinic since your last visit? Hospitalized since your last visit? No    2. Have you seen or consulted any other health care providers outside of the [address] [place] [address] since your last visit? Include any pap smears or colon screening. No     Results for orders placed or performed in visit on 01/23/18   AMB POC PATRICE INFLUENZA A/B TEST   Result Value Ref Range    VALID INTERNAL CONTROL POC Yes     Influenza A Ag POC Positive Negative Pos/Neg    Influenza B Ag POC Negative Negative Pos/Neg       Chief Complaint   Patient presents with    Nasal Congestion     congestion, sore throat, headache , fever, since this am     He is a 67 y.o. male who presents for evalution. Reviewed PmHx, RxHx, FmHx, SocHx, AllgHx and updated and dated in the chart. Patient Active Problem List    Diagnosis    Advance care planning     Has LW      Male erectile dysfunction    Essential hypertension, benign    Gout       Review of Systems - negative except as listed above in the HPI    Objective:     Vitals:    01/23/18 1437   BP: 129/80   Pulse: 82   Resp: 20   Temp: 99.5 °F (37.5 °C)   SpO2: 95%   Weight: 200 lb (90.7 kg)   Height: 5' 10\" (1.778 m)     Physical Examination: General appearance - alert, well appearing, and in no distress  Chest - clear to auscultation, no wheezes, rales or rhonchi, symmetric air entry  Heart - normal rate, regular rhythm, normal S1, S2, no murmurs, rubs, clicks or gallops    Assessment/ Plan:   Diagnoses and all orders for this visit:    1. Influenza  -supp care    2. Fever, unspecified fever cause  -     AMB POC PATRICE INFLUENZA A/B TEST-pos A    3. Nasal congestion  -     AMB POC PATRICE INFLUENZA A/B TEST       Follow-up Disposition:  Return if symptoms worsen or fail to improve. I have discussed the diagnosis with the patient and the intended plan as seen in the above orders.   The patient understands and agrees with the plan. The patient has received an after-visit summary and questions were answered concerning future plans. Medication Side Effects and Warnings were discussed with patient  Patient Labs were reviewed and or requested:  Patient Past Records were reviewed and or requested    Isaias Segovia M.D. There are no Patient Instructions on file for this visit.

## 2018-01-23 NOTE — MR AVS SNAPSHOT
[address] [address] 
[phone] Patient: Eboni Diez MRN: [id number] VZQ:[birth year] Visit Information Date & Time Provider Department Dept. Phone Encounter #  
 1/23/2018  2:20 PM Paris Espinal MD [address] [facility] [phone] [id number] Follow-up Instructions Return if symptoms worsen or fail to improve. Your Appointments 3/23/2018 10:30 AM  
Office Visit with EZE Dasilva 8057 [facility] Sanchez Appt Note: est pt  
 [facility] A [address] [place] [address] Upcoming Health Maintenance Date Due DTaP/Tdap/Td series (1 - Tdap) 5[birth year]4/1966 MEDICARE YEARLY EXAM 9/15/2018 GLAUCOMA SCREENING Q2Y 9/14/2019 COLONOSCOPY 8/10/2021 Allergies as of 1/23/2018  Review Complete On: 1/23/2018 By: Paris Espinal MD  
 No Known Allergies Current Immunizations  Reviewed on 8/30/2016 Name Date Influenza High Dose Vaccine PF 9/14/2017 Influenza Vaccine 9/25/2013 Pneumococcal Conjugate (PCV-13) 4/27/2015 Pneumococcal Vaccine (Unspecified Type) 2/20/2013 Zoster Vaccine, Live 2/20/2013 Not reviewed this visit You Were Diagnosed With   
  
 Codes Comments Influenza    -  Primary ICD-10-CM: J11.1 ICD-9-CM: 692.8 Fever, unspecified fever cause     ICD-10-CM: R50.9 ICD-9-CM: 780.60 Nasal congestion     ICD-10-CM: R09.81 ICD-9-CM: 478.19 Vitals BP Pulse Temp Resp Height(growth percentile) Weight(growth percentile) 129/80 82 99.5 °F (37.5 °C) 20 5' 10\" (1.778 m) 200 lb (90.7 kg) SpO2 BMI Smoking Status 95% 28.7 kg/m2 Never Smoker Vitals History BMI and BSA Data Body Mass Index Body Surface Area 28.7 kg/m 2 2.12 m 2 Preferred Pharmacy Pharmacy Name Phone [address] [phone] Your Updated Medication List  
  
   
This list is accurate as of: 1/23/18  3:07 PM.  Always use your most recent med list.  
  
  
  
  
 ALEVE 220 mg Cap Generic drug:  naproxen sodium Take  by mouth. allopurinol 100 mg tablet Commonly known as:  Wayne Stout Take 1 Tab by mouth daily. aspirin 81 mg chewable tablet Take 81 mg by mouth daily. diclofenac EC 75 mg EC tablet Commonly known as:  VOLTAREN  
TAKE 1 TABLET TWICE DAILY FOLIC ACID PO Take  by mouth. Tmuymqlw-Ugkg-Kqiurl-Hyalur Ac 432-970-70-2 mg Cap Take  by mouth daily. indomethacin 50 mg capsule Commonly known as:  INDOCIN Take 1 Cap by mouth three (3) times daily. lisinopril-hydroCHLOROthiazide 10-12.5 mg per tablet Commonly known as:  PRINZIDE, ZESTORETIC  
TAKE 1 TABLET EVERY DAY We Performed the Following AMB POC PATRICE INFLUENZA A/B TEST [17456 CPT(R)] Follow-up Instructions Return if symptoms worsen or fail to improve. Introducing [facility] & HEALTH SERVICES! Dear Marilynn Nelson: Thank you for requesting a [employer] account. Our records indicate that you already have an active [employer] account. You can access your account anytime at [email or web address] Did you know that you can access your hospital and ER discharge instructions at any time in [employer]? You can also review all of your test results from your hospital stay or ER visit. Additional Information If you have questions, please visit the Frequently Asked Questions section of the [employer] website at [email or web address] Remember, [employer]ISS[employer] is NOT to be used for urgent needs. For medical emergencies, dial 911. Now available from your iPhone and Android! Please provide this summary of care documentation to your next provider. Your primary care clinician is listed as TOMMIE GILMORE. If you have any questions after today's visit, please call [phone].

## 2018-03-08 ENCOUNTER — OFFICE VISIT (OUTPATIENT)
Dept: FAMILY MEDICINE CLINIC | Age: 73
End: 2018-03-08

## 2018-03-08 VITALS
HEART RATE: 63 BPM | TEMPERATURE: 97.9 F | OXYGEN SATURATION: 98 % | RESPIRATION RATE: 18 BRPM | SYSTOLIC BLOOD PRESSURE: 154 MMHG | DIASTOLIC BLOOD PRESSURE: 89 MMHG | WEIGHT: 199.6 LBS | BODY MASS INDEX: 28.58 KG/M2 | HEIGHT: 70 IN

## 2018-03-08 DIAGNOSIS — I10 ESSENTIAL HYPERTENSION, BENIGN: Primary | ICD-10-CM

## 2018-03-08 DIAGNOSIS — J06.9 UPPER RESPIRATORY TRACT INFECTION, UNSPECIFIED TYPE: ICD-10-CM

## 2018-03-08 RX ORDER — LORATADINE 10 MG/1
10 TABLET ORAL DAILY
Qty: 30 TAB | Refills: 11 | Status: SHIPPED | OUTPATIENT
Start: 2018-03-08 | End: 2018-08-22 | Stop reason: SDUPTHER

## 2018-03-08 NOTE — MR AVS SNAPSHOT
[address] [address] 
[phone] Patient: Holly Mendoza MRN: [id number] GWB:[birth year] Visit Information Date & Time Provider Department Dept. Phone Encounter #  
 3/8/2018  3:45 PM Ken Mcqueen MD 5[address] [facility] [phone] [id number] Follow-up Instructions Return if symptoms worsen or fail to improve. Your Appointments 3/29/2018  2:30 PM  
Office Visit with EZE Escobar 8057 [facility]) Appt Note: est pt; skin exam  
 [facility] [address] [facility] [address] [address]  
[address] [facility] [address] [address] Upcoming Health Maintenance Date Due DTaP/Tdap/Td series (1 - Tdap) 5/4/1965 Bone Densitometry (Dexa) Screening 5/4/2009 MEDICARE YEARLY EXAM 9/15/2018 GLAUCOMA SCREENING Q2Y 9/14/2019 COLONOSCOPY 8/10/2021 Allergies as of 3/8/2018  Review Complete On: 3/8/2018 By: Ken Mcqueen MD  
 No Known Allergies Current Immunizations  Reviewed on 8/30/2016 Name Date Influenza High Dose Vaccine PF 9/14/2017 Influenza Vaccine 9/25/2013 Pneumococcal Conjugate (PCV-13) 4/27/2015 Pneumococcal Vaccine (Unspecified Type) 2/20/2013 Zoster Vaccine, Live 2/20/2013 Not reviewed this visit You Were Diagnosed With   
  
 Codes Comments Essential hypertension, benign    -  Primary ICD-10-CM: I10 
ICD-9-CM: 401.1 Upper respiratory tract infection, unspecified type     ICD-10-CM: J06.9 ICD-9-CM: 465.9 Vitals BP Pulse Temp Resp Height(growth percentile) Weight(growth percentile) 154/89 63 97.9 °F (36.6 °C) (Oral) 18 5' 10\" (1.778 m) 199 lb 9.6 oz (90.5 kg) SpO2 BMI Smoking Status 98% 28.64 kg/m2 Never Smoker Vitals History BMI and BSA Data  Body Mass Index Body Surface Area  
 28.64 kg/m 2 2.11 m 2  
  
  
 Preferred Pharmacy Pharmacy Name Phone [address] [phone] Your Updated Medication List  
  
   
This list is accurate as of 3/8/18  4:29 PM.  Always use your most recent med list.  
  
  
  
  
 ALEVE 220 mg Cap Generic drug:  naproxen sodium Take  by mouth. allopurinol 100 mg tablet Commonly known as:  Berna Batty Take 1 Tab by mouth daily. aspirin 81 mg chewable tablet Take 81 mg by mouth daily. diclofenac EC 75 mg EC tablet Commonly known as:  VOLTAREN  
TAKE 1 TABLET TWICE DAILY FOLIC ACID PO Take  by mouth. Iwawmkiv-Jpnr-Emyiro-Hyalur Ac 301-484-26-2 mg Cap Take  by mouth daily. indomethacin 50 mg capsule Commonly known as:  INDOCIN Take 1 Cap by mouth three (3) times daily. lisinopril-hydroCHLOROthiazide 10-12.5 mg per tablet Commonly known as:  PRINZIDE, ZESTORETIC  
TAKE 1 TABLET EVERY DAY  
  
 loratadine 10 mg tablet Commonly known as:  Tiajuana Bile Take 1 Tab by mouth daily for 360 days. Prescriptions Sent to Pharmacy Refills  
 loratadine (CLARITIN) 10 mg tablet 11 Sig: Take 1 Tab by mouth daily for 360 days. Class: Normal  
 Pharmacy: [facility] [address] Ph #: [phone] Route: Oral  
  
Follow-up Instructions Return if symptoms worsen or fail to improve. [facility] & HEALTH SERVICES! Dear hSaan Mancia: Thank you for requesting a [employer] account. Our records indicate that you already have an active [employer] account. You can access your account anytime at [email or web address] Did you know that you can access your hospital and ER discharge instructions at any time in [employer]? You can also review all of your test results from your hospital stay or ER visit. Additional Information If you have questions, please visit the Frequently Asked Questions section of the [employer] website at [email or web address] me [email or web address] Remember, [employer] is NOT to be used for urgent needs. For medical emergencies, dial 911. Now available from your iPhone and Android! Please provide this summary of care documentation to your next provider. Your primary care clinician is listed as TOMMIE GILMORE. If you have any questions after today's visit, please call [phone].

## 2018-03-08 NOTE — PROGRESS NOTES
Chief Complaint   Patient presents with    Cold Symptoms     congestion/drainage-Left ear ringing/roaring    Dizziness     when waking in morning    Hypertension     1. Have you been to the ER, urgent care clinic since your last visit? Hospitalized since your last visit? No    2. Have you seen or consulted any other health care providers outside of the [address] [place] [address] since your last visit? Include any pap smears or colon screening. No        Chief Complaint   Patient presents with    Cold Symptoms     congestion/drainage-Left ear ringing/roaring    Dizziness     when waking in morning    Hypertension     He is a 67 y.o. male who presents for evalution. Reviewed PmHx, RxHx, FmHx, SocHx, AllgHx and updated and dated in the chart. Patient Active Problem List    Diagnosis    Advance care planning     Has LW      Male erectile dysfunction    Essential hypertension, benign    Gout       Review of Systems - negative except as listed above in the HPI    Objective:     Vitals:    03/08/18 1620   BP: 154/89   Pulse: 63   Resp: 18   Temp: 97.9 °F (36.6 °C)   TempSrc: Oral   SpO2: 98%   Weight: 199 lb 9.6 oz (90.5 kg)   Height: 5' 10\" (1.778 m)     Physical Examination: General appearance - alert, well appearing, and in no distress  Nose - normal and patent, no erythema, discharge or polyps  Mouth - mucous membranes moist, pharynx normal without lesions  Neck - supple, no significant adenopathy  Chest - clear to auscultation, no wheezes, rales or rhonchi, symmetric air entry  Heart - normal rate, regular rhythm, normal S1, S2, no murmurs, rubs, clicks or gallops    Assessment/ Plan:   Diagnoses and all orders for this visit:    1. Essential hypertension, benign  -inc with uri  -cont rx    2. Upper respiratory tract infection, unspecified type  -     loratadine (CLARITIN) 10 mg tablet;  Take 1 Tab by mouth daily for 360 days.  -add rx       Follow-up Disposition:  Return if symptoms worsen or fail to improve. I have discussed the diagnosis with the patient and the intended plan as seen in the above orders. The patient understands and agrees with the plan. The patient has received an after-visit summary and questions were answered concerning future plans. Medication Side Effects and Warnings were discussed with patient  Patient Labs were reviewed and or requested:  Patient Past Records were reviewed and or requested    Yessenia Metcalf M.D. There are no Patient Instructions on file for this visit.

## 2018-03-29 ENCOUNTER — HOSPITAL ENCOUNTER (OUTPATIENT)
Dept: LAB | Age: 73
Discharge: HOME | End: 2018-03-29

## 2018-03-29 ENCOUNTER — OFFICE VISIT (OUTPATIENT)
Dept: DERMATOLOGY | Facility: AMBULATORY SURGERY CENTER | Age: 73
End: 2018-03-29

## 2018-03-29 VITALS
BODY MASS INDEX: 28.49 KG/M2 | HEIGHT: 70 IN | DIASTOLIC BLOOD PRESSURE: 80 MMHG | HEART RATE: 79 BPM | WEIGHT: 199 LBS | RESPIRATION RATE: 16 BRPM | OXYGEN SATURATION: 98 % | TEMPERATURE: 98 F | SYSTOLIC BLOOD PRESSURE: 122 MMHG

## 2018-03-29 DIAGNOSIS — D48.5 NEOPLASM OF UNCERTAIN BEHAVIOR OF SKIN OF CHEST: ICD-10-CM

## 2018-03-29 DIAGNOSIS — D48.5 NEOPLASM OF UNCERTAIN BEHAVIOR OF SKIN OF BACK: Primary | ICD-10-CM

## 2018-03-29 DIAGNOSIS — Z85.828 FOLLOW-UP SURVEILLANCE OF SKIN CANCER, ENCOUNTER FOR: ICD-10-CM

## 2018-03-29 DIAGNOSIS — L81.4 LENTIGINES: ICD-10-CM

## 2018-03-29 DIAGNOSIS — Z08 FOLLOW-UP SURVEILLANCE OF SKIN CANCER, ENCOUNTER FOR: ICD-10-CM

## 2018-03-29 DIAGNOSIS — L90.5 SCAR CONDITION AND FIBROSIS OF SKIN: ICD-10-CM

## 2018-03-29 DIAGNOSIS — L82.1 OTHER SEBORRHEIC KERATOSIS: ICD-10-CM

## 2018-03-29 DIAGNOSIS — Z85.828 HISTORY OF NONMELANOMA SKIN CANCER: ICD-10-CM

## 2018-03-29 DIAGNOSIS — D18.01 CHERRY ANGIOMA: ICD-10-CM

## 2018-03-29 NOTE — PROGRESS NOTES
Name: Drea Patrick       Age: 67 y.o. Date: 3/29/2018    Chief Complaint:   Chief Complaint   Patient presents with    Skin Exam       Subjective:    HPI  Mr. Drea Patrick is a 67 y.o. male who presents for a full skin exam.  The patient's last skin exam was in August of 2017 and the patient does have current complaints related to his skin. He notes a brown lesion on the left arm, a larger dark lesion on the left ear and a scaly area on the right hand. The patient's pertinent skin history includes : multiple NMSC    ROS: Constitutional: Negative. Dermatological : positive for - skin lesion changes      Social History     Social History    Marital status: SINGLE     Spouse name: N/A    Number of children: N/A    Years of education: N/A     Occupational History    Not on file. Social History Main Topics    Smoking status: Never Smoker    Smokeless tobacco: Never Used    Alcohol use 1.0 oz/week     1 Glasses of wine, 1 Cans of beer per week      Comment: 2-3 times a week    Drug use: No    Sexual activity: Not on file     Other Topics Concern    Not on file     Social History Narrative       History reviewed. No pertinent family history. Past Medical History:   Diagnosis Date    Arthritis     gout    Family history of skin cancer     Gout 11/13/2013    Hypertension     Radiation exposure     Skin cancer     Sun-damaged skin        Past Surgical History:   Procedure Laterality Date    COLONOSCOPY  11/20/2014         HX COLONOSCOPY  2006 2015 1 polyp,     HX HEENT      oral surgery       Current Outpatient Prescriptions   Medication Sig Dispense Refill    loratadine (CLARITIN) 10 mg tablet Take 1 Tab by mouth daily for 360 days. 30 Tab 11    indomethacin (INDOCIN) 50 mg capsule Take 1 Cap by mouth three (3) times daily.  90 Cap 2    lisinopril-hydroCHLOROthiazide (PRINZIDE, ZESTORETIC) 10-12.5 mg per tablet TAKE 1 TABLET EVERY DAY 90 Tab 1    diclofenac EC (VOLTAREN) 75 mg EC tablet TAKE 1 TABLET TWICE DAILY 180 Tab 5    naproxen sodium (ALEVE) 220 mg cap Take  by mouth.  FOLIC ACID PO Take  by mouth.  Vbwnnbpy-Hksu-Irtrpa-Hyalur Ac 307-322-39-2 mg cap Take  by mouth daily.  aspirin 81 mg chewable tablet Take 81 mg by mouth daily.  allopurinol (ZYLOPRIM) 100 mg tablet Take 1 Tab by mouth daily. 90 Tab 3       No Known Allergies      Objective:    Visit Vitals    /80 (BP 1 Location: Left arm, BP Patient Position: Sitting)    Pulse 79    Temp 98 °F (36.7 °C) (Oral)    Resp 16    Ht 5' 10\" (1.778 m)    Wt 90.3 kg (199 lb)    SpO2 98%    BMI 28.55 kg/m2       Cecil Pacheco is a 67 y.o. male who appears well and in no distress. He is awake, alert, and oriented. There is no preauricular, submandibular, or cervical lymphadenopathy. A skin examination was performed including his scalp, face (including eyelids), ears, neck, chest, back, abdomen, upper extremities (including digits/nails), lower extremities (except feet), breasts. He has lentigines on sun exposed areas and a mild sun tan. There are scattered stuck on waxy macules and keratotic papules consistent with seborrheic keratoses. There are cherry angiomas. There is a venous lake on the left ear - compressible violaceous papule. There are medium and dark brown junctional and a few intradermal nevi without concerning features for severe atypia. There are multiple well healed scars - one concerning for recurrence on the left chest  - shiny pink macule in a linear old scar. There is a pink macule on the left upper back, 5 x 4  Mm in size concerning for BCC. Assessment/Plan:  1. Neoplasm of Uncertain Behavior, left upper back. The differential diagnoses were discussed. Shave biopsy and curettage was advised to address this lesion with malignant destruction. The procedure was reviewed and verbal and written consent were obtained.   The risks of pain, bleeding, infection, scar, and recurrence of the lesion were discussed. I performed the procedure. The site was cleansed and anesthetized with 1% Lidocaine with Epinephrine 1:100,000. Curettage was performed by me to include a 2 mm margin, resulting in a post curettage defect size of 9 x 8 mm. Drysol was used for hemostasis. The wound was bandaged and care reviewed. The specimen was sent to pathology. I will contact the patient with the results and any further treatment that may be necessary. 2. Neoplasm of Uncertain Behavior, left chest.  The differential diagnoses were discussed. A shave biopsy was advised to sample this lesion. The procedure was reviewed and verbal and written consent were obtained. The risks of pain, bleeding, infection, and scar were discussed. The patient is aware that this is a sample and is intended for diagnosis and not therapy of the skin lesion. I performed the procedure. The site was cleansed and anesthetized with 1% Lidocaine with Epinephrine 1:100,000. A shave biopsy was performed to sample the lesion. Drysol was used for hemostasis. The wound was bandaged and care reviewed. The specimen was sent to pathology. I will contact the patient with the results and any further treatment that may be necessary. 3.Solar lentigos. The diagnosis and relationship to sun exposure was reviewed. Sun protection advised. 4.Seborrheic keratoses. The diagnosis was reviewed and the patient was reassured that no treatment is needed for these benign lesions. 5.Cherry angiomas. The diagnosis was reviewed and the patient was reassured that no treatment is needed for these benign lesions. 6. Personal history of skin cancer. I discussed sun protection, sunscreen use, the warning signs of skin cancer, the need for self-skin examinations, and the need for regular practitioner exams every 6 months. The patient should follow up sooner as needed if new, changing, or symptomatic skin lesions arise. 7.Normal nevi. The diagnosis of normal nevi was reviewed. I discussed sun protection, sunscreen use, the warning signs of skin cancer, the need for self-skin examinations, and the need for regular practitioner exams every 6 months. The patient should follow up sooner as needed if new, changing, or symptomatic skin lesions arise. [facility]   OFFICE PROCEDURE PROGRESS NOTE   Chart reviewed for the following:   IMorgan, have reviewed the History, Physical and updated the Allergic reactions for Brianda Best. TIME OUT performed immediately prior to start of procedure:   I, Shelley B. Glenard Primrose, [address], have performed the following reviews on Brianda Best   prior to the start of the procedure:     * Patient was identified by name and date of birth   * Agreement on procedure being performed was verified   * Risks and Benefits explained to the patient   * Procedure site verified and marked as necessary   * Patient was positioned for comfort   * Consent was signed and verified     Time: 4179  Date of procedure: 3/29/2018  Procedure performed by: Harriet Soler.  Anuj Whitley  Provider assisted by: lpn   Patient assisted by: self   How tolerated by patient: tolerated the procedure well with no complications   Comments: none

## 2018-03-29 NOTE — MR AVS SNAPSHOT
[address] [facility] [address] [place] [address] [address] [facility] 
[phone] Patient: Lora Kaur MRN: [id number] KDT:3[birth year] Visit Information Date & Time Provider Department Dept. Phone Encounter #  
 3/29/2018  2:30 PM Halford Fabry, NP [place] [phone] Your Appointments 3/29/2018  2:30 PM  
Office Visit with Halford Fabry, NP [place] [address] [facility] Appt Note: est pt; skin exam  
 [facility] [address] [facility] [address]  
[facility] [address] [facility] [address] Upcoming Health Maintenance Date Due DTaP/Tdap/Td series (1 - Tdap) 5/4/1965 MEDICARE YEARLY EXAM 9/15/2018 GLAUCOMA SCREENING Q2Y 9/14/2019 COLONOSCOPY 8/10/2021 Allergies as of 3/29/2018  Review Complete On: 3/29/2018 By: Jose Harris LPN No Known Allergies Current Immunizations  Reviewed on 8/30/2016 Name Date Influenza High Dose Vaccine PF 9/14/2017 Influenza Vaccine 9/25/2013 Pneumococcal Conjugate (PCV-13) 4/27/2015 Pneumococcal Vaccine (Unspecified Type) 2/20/2013 Zoster Vaccine, Live 2/20/2013 Not reviewed this visit Vitals BP Pulse Temp Resp Height(growth percentile) Weight(growth percentile) 122/80 (BP 1 Location: Left arm, BP Patient Position: Sitting) 79 98 °F (36.7 °C) (Oral) 16 5' 10\" (1.778 m) 199 lb (90.3 kg) SpO2 BMI Smoking Status 98% 28.55 kg/m2 Never Smoker BMI and BSA Data Body Mass Index Body Surface Area 28.55 kg/m 2 2.11 m 2 Preferred Pharmacy Pharmacy Name Phone [address] [phone] Your Updated Medication List  
  
   
This list is accurate as of 3/29/18  2:26 PM.  Always use your most recent med list.  
  
  
  
  
 ALEVE 220 mg Cap Generic drug:  naproxen sodium Take  by mouth. allopurinol 100 mg tablet Commonly known as:  Delona Gauze Take 1 Tab by mouth daily. aspirin 81 mg chewable tablet Take 81 mg by mouth daily. diclofenac EC 75 mg EC tablet Commonly known as:  VOLTAREN  
TAKE 1 TABLET TWICE DAILY FOLIC ACID PO Take  by mouth. Regvfaae-Qoqi-Twhros-Hyalur Ac 200-447-78-2 mg Cap Take  by mouth daily. indomethacin 50 mg capsule Commonly known as:  INDOCIN Take 1 Cap by mouth three (3) times daily. lisinopril-hydroCHLOROthiazide 10-12.5 mg per tablet Commonly known as:  PRINZIDE, ZESTORETIC  
TAKE 1 TABLET EVERY DAY  
  
 loratadine 10 mg tablet Commonly known as:  Walt Ivelisse Take 1 Tab by mouth daily for 360 days. Patient Instructions Self Skin Exam and Sunscreens Early detection and treatment is essential in the treatment of all forms of skin cancer. If caught early, all forms of skin cancer are curable. In addition to your regular visits, you should perform a monthly skin examination. Over time, you become familiar with what is normally found on your skin and can identify new or suspicious spots. One of the screening tools you can use to assess your skin is to follow the ABCDEs: 
 
A= Asymmetry (One half is unlike the other half) B= Border (An irregular, scalloped or poorly defined edge) C= Color (Is varied from one area to another, has shades of tan, brown/ black,       white, red or blue) D= Diameter (Spots larger than 6mm or a pencil eraser) E= Evolving (New spots or one that is changing in size, shape, or color) A follow- up interval will be customized based on your history of skin cancer or level of skin damage and risk factors. In any case, if you notice a suspicious or new spot, an appointment should be arranged between regular visits.  
 
Everyone should use sunscreen and sun-safe practices, which is especially important for those with a personal or family history of skin cancer. Suggestions for this include: 1. Use daily moisturizers containing SPF 30 or higher. 2. Wear long sleeve clothing with UPF ratings and a broad-brimmed hat. 3. Apply sunscreen with SPF 30 or higher to all sun exposed areas if you are going to be in the sun. A broad spectrum UVA/ UVB sunscreen is best.  Dont forget to REAPPLY every two hours or more often if swimming or sweating! 4. Avoid outside activities during peak sun hours, especially in the summer (10am- 2pm). 5. DO NOT use tanning beds. Using sunscreen and sun-safe practices can help reduce the likelihood of developing skin cancer or additional skin cancers in those previously diagnosed. Introducing [facility] & [facility] SERVICES! Dear Lorna Catalan: Thank you for requesting a [employer] account. Our records indicate that you already have an active [employer] account. You can access your account anytime at [email or web address] Did you know that you can access your hospital and ER discharge instructions at any time in [employer]? You can also review all of your test results from your hospital stay or ER visit. Additional Information If you have questions, please visit the Frequently Asked Questions section of the [employer] website at [email or web address] Remember, [employer] is NOT to be used for urgent needs. For medical emergencies, dial 911. Now available from your iPhone and Android! Please provide this summary of care documentation to your next provider. Your primary care clinician is listed as TOMMIE GILMORE. If you have any questions after today's visit, please call [phone].

## 2018-04-02 NOTE — PROGRESS NOTES
Spoke w/ pt to advise diagnosis of BCC on both spots.  recladonna. Mohs on the Left chest. He is scheduling now.

## 2018-05-22 DIAGNOSIS — M10.9 GOUT, UNSPECIFIED CAUSE, UNSPECIFIED CHRONICITY, UNSPECIFIED SITE: ICD-10-CM

## 2018-05-22 RX ORDER — ALLOPURINOL 100 MG/1
100 TABLET ORAL DAILY
Qty: 90 TAB | Refills: 3 | Status: SHIPPED | OUTPATIENT
Start: 2018-05-22 | End: 2019-05-20 | Stop reason: SDUPTHER

## 2018-05-22 RX ORDER — LISINOPRIL AND HYDROCHLOROTHIAZIDE 10; 12.5 MG/1; MG/1
TABLET ORAL
Qty: 90 TAB | Refills: 1 | Status: SHIPPED | OUTPATIENT
Start: 2018-05-22 | End: 2018-08-09 | Stop reason: ALTCHOICE

## 2018-05-30 ENCOUNTER — OFFICE VISIT (OUTPATIENT)
Dept: DERMATOLOGY | Facility: AMBULATORY SURGERY CENTER | Age: 73
End: 2018-05-30

## 2018-05-30 ENCOUNTER — HOSPITAL ENCOUNTER (OUTPATIENT)
Dept: LAB | Age: 73
Discharge: HOME | End: 2018-05-30

## 2018-05-30 VITALS
DIASTOLIC BLOOD PRESSURE: 70 MMHG | WEIGHT: 199 LBS | SYSTOLIC BLOOD PRESSURE: 120 MMHG | OXYGEN SATURATION: 99 % | RESPIRATION RATE: 16 BRPM | HEIGHT: 70 IN | BODY MASS INDEX: 28.49 KG/M2 | HEART RATE: 62 BPM

## 2018-05-30 DIAGNOSIS — D48.5 NEOPLASM OF UNCERTAIN BEHAVIOR OF SKIN OF EAR: ICD-10-CM

## 2018-05-30 DIAGNOSIS — C44.519 BASAL CELL CARCINOMA OF SKIN OF OTHER PART OF TRUNK: ICD-10-CM

## 2018-05-30 DIAGNOSIS — C44.91 RECURRENT BASAL CELL CARCINOMA: Primary | ICD-10-CM

## 2018-05-30 NOTE — PROGRESS NOTES
This note is written by Clau Benítez, as dictated by Edouard Pham. Sera Villanueva MD.    CC: Recurrent basal cell carcinoma on the left chest     History of present illness:     Enmanuel Nugent is a 75 y.o. male referred by Beatriz Dozier, [address]. He has a biopsy-proven superficial basal cell carcinoma on the left chest. This is a recurrent basal cell carcinoma present for an unknown amount of time described as a lesion noticed by Beatriz Dozier DNP with prior treatment of removal in the past. Biopsy confirmed the diagnosis of basal cell carcinoma, and I reviewed the written pathology report. He reports a bothersome lesion on his left antihelix that is raised and dark. He is feeling well and in his usual state of health today. He has no pain, no current illnesses, no other skin concerns. His allergies, medications, medical, and social history are reviewed by me today. Exam:     He is an awake, alert, and oriented 75 y.o. male who appears well and in no distress. There is no preauricular, submandibular, or cervical lymphadenopathy. I examined his left chest and left antihelix. He has a 16 x 8 mm indistinct patch at the midpoint of a scar on his left chest. He confirms location. He has an 8 x 4 mm blue and gray papule on his left antihelix. Assessment/plan:    1. Recurrent basal cell carcinoma, left chest. I discussed the diagnosis of basal cell carcinoma and summarized the pathology report. Mohs surgery is indicated by size, poor definition, recurrence, and histology. The procedure was discussed, verbal and written consent were obtained. I performed the procedure. One stage was required to reach a tumor free plane. The surgical defect was managed with intermediate repair. There were no complications. He will follow up as needed as the site heals. Indications, risks, and options were discussed with Enmanuel Nugent preoperatively.  Risks including, but not limited to: pain, bleeding, infection, tumor recurrence, scarring and damage to motor and/or sensory nerves, were discussed. Lilliam Travis chose Mohs surgery. Lilliam Travis was an acceptable surgery candidate. Lilliam Travis was placed in the appropriate position on the operating table in the Mohs surgery procedure room. The area was prepped and draped in the standard manner. Gentian violet was used to outline the clinical margins of the tumor. Local anesthesia was then obtained. The grossly visible tumor was then removed, an underlying layer was excised and mapped according to the Mohs technique, and the individual specimens examined microscopically. The process was repeated until microscopic examination of the tissue specimens confirmed a tumor-free plane. Hemostasis was obtained with electrosurgery and pressure. The wound was covered between stages with moist saline gauze. The wound management options of second intent healing, layered closure, local flap, and/or full thickness skin graft were discussed. Lilliam Travis understands the aims, risks, alternatives, and possible complications and elects to proceed with an intermediate layered closure. Wound margins were made vertical, edges undermined in the subcutaneous plane, standing cones corrected at both poles followed by layered closure. The wound was closed with buried 4-0 polysorb suture in the subcutis to reduce tension on the skin edges, and skin edges were approximated with 6-0 fast gut suture in the dermis to reduce tension on the epidermis. The final closure length was 36 mm. The wound was bandaged with steristrips, Telfa, gauze and Coverroll. Wound care instructions (written and verbal) and a follow up appointment were given to Lilliam Travis before discharge. Lilliam Travis was discharged in good condition. 2. Neoplasm of Uncertain Behavior, left antihelix. The differential diagnoses were discussed.  Excision was advised for removal and therapy of this 8 x 4 mm lesion on left antihelix. The excision procedure was reviewed and verbal and written consent were obtained. The risks of pain, bleeding, infection, recurrence of the lesion, and scar were discussed. I performed the procedure. The site was cleansed and anesthetized with 1% lidocaine with epinephrine 1:100,000. The lesion was excised to a depth of the subcutaneous tissue. Electrosurgery was used for hemostasis. A simple repair was performed after the excision. 6-0 fast gut suture was used to approximate the skin edges. The closure length was 12 mm. The wound was bandaged and care reviewed. The specimen was sent to pathology. I will contact the patient with the results and any further treatment that may be necessary. 3. History of nonmelanoma skin cancer. I discussed the diagnosis and recommend routine examinations with Edu Patrick DNP for surveillance. The documentation recorded by the scribe accurately reflects the service I personally performed and the decisions made by me. [facility]   OFFICE PROCEDURE PROGRESS NOTE     Chart reviewed for the following:     Hiwot Reyes MD, have reviewed the History, Physical and updated the Allergic reactions for [address] performed immediately prior to start of procedure:     Hiwot Reyes MD, have performed the following reviews on [employer] prior to the start of the procedure:     * Patient was identified by name and date of birth   * Agreement on procedure being performed was verified   * Risks and Benefits explained to the patient   * Procedure site verified and marked as necessary   * Patient was positioned for comfort   * Consent was signed and verified     Time: 10:30 AM   Date of procedure: 5/30/2018  Procedure performed by: Bettina Vinson.  Jenniffer Reyes MD   Provider assisted by: LPN   Patient assisted by: self   How tolerated by patient: tolerated the procedure well with no complications Comments: none

## 2018-05-30 NOTE — MR AVS SNAPSHOT
[address] [facility] [address] [place] [address] [address] [place] [address] 
[phone] Patient: Paddy Do MRN: [id number] [facility]:[birth year] Visit Information Date & Time Provider Department Dept. Phone Encounter #  
 5/30/2018 10:00 AM MD Arnulfo Nuñez [id number] [phone] [id number] Your Appointments 10/2/2018 10:00 AM  
Office Visit with EZE Carlson [address] [place] [address]) Appt Note: 6 month skin exam  
 [facility] [address] [facility] [address]  
[facility] [address] [place] [address] Upcoming Health Maintenance Date Due DTaP/Tdap/Td series (1 - Tdap) 5/4/1966 Influenza Age 5 to Adult 8/1/2018 MEDICARE YEARLY EXAM 9/15/2018 GLAUCOMA SCREENING Q2Y 9/14/2019 COLONOSCOPY 8/10/2021 Allergies as of 5/30/2018  Review Complete On: 5/30/2018 By: Festus Lowe LPN No Known Allergies Current Immunizations  Reviewed on 8/30/2016 Name Date Influenza High Dose Vaccine PF 9/14/2017 Influenza Vaccine 9/25/2013 Pneumococcal Conjugate (PCV-13) 4/27/2015 Pneumococcal Vaccine (Unspecified Type) 2/20/2013 Zoster Vaccine, Live 2/20/2013 Not reviewed this visit You Were Diagnosed With   
  
 Codes Comments Recurrent basal cell carcinoma    -  Primary ICD-10-CM: C44.91 
ICD-9-CM: 173.91 Neoplasm of uncertain behavior of skin of ear     ICD-10-CM: D48.5 ICD-9-CM: 238.2 Vitals BP Pulse Resp Height(growth percentile) Weight(growth percentile) SpO2  
 120/70 (BP 1 Location: Left arm, BP Patient Position: Sitting) 62 16 5' 10\" (1.778 m) 199 lb (90.3 kg) 99% BMI Smoking Status 28.55 kg/m2 Never Smoker BMI and BSA Data Body Mass Index Body Surface Area 28.55 kg/m 2 2.11 m 2 [facility] [phone] Your Updated Medication List  
  
   
This list is accurate as of 5/30/18 11:43 AM.  Always use your most recent med list.  
  
  
  
  
 ALEVE 220 mg Cap Generic drug:  naproxen sodium Take  by mouth. allopurinol 100 mg tablet Commonly known as:  Merrily Schilder Take 1 Tab by mouth daily. aspirin 81 mg chewable tablet Take 81 mg by mouth daily. diclofenac EC 75 mg EC tablet Commonly known as:  VOLTAREN  
TAKE 1 TABLET TWICE DAILY FOLIC ACID PO Take  by mouth. Dccnfiuz-Kmyl-Mpobhw-Hyalur Ac 201-618-84-2 mg Cap Take  by mouth daily. indomethacin 50 mg capsule Commonly known as:  INDOCIN Take 1 Cap by mouth three (3) times daily. lisinopril-hydroCHLOROthiazide 10-12.5 mg per tablet Commonly known as:  PRINZIDE, ZESTORETIC  
TAKE 1 TABLET EVERY DAY  
  
 loratadine 10 mg tablet Commonly known as:  Ligia Fleischer Take 1 Tab by mouth daily for 360 days. Patient Instructions WOUND CARE INSTRUCTIONS 1. Keep the dressing clean and dry and do not remove for 48 hours. 2. Then change the dressing once a day as follows: 
a. Wash hands before and after each dressing change. b. Remove dressing and wash site gently with mild soap and water, rinse, and pat dry. 
c. Apply an ointment (Bacitracin, Polysporin, Neosporin, Petroleum jelly or Aquaphor). d. Apply a non-stick (Telfa) dressing or Band-Aid to cover the wound. Remove pressure bandage on Friday, then wash site gently. Leave steri-strips in place until it naturally begins to peel off, about 4-6 days, then remove. After 7 days, if steri-strips remain, they may be removed. Vaseline may be applied at this point for one week. 3. Watch for: BLEEDING: A small amount of drainage may occur. If bleeding occurs, elevate and rest the surgery site.  Apply gauze and steady pressure for 15 minutes. If bleeding continues, call this office. INFECTION: Signs of infection include increased redness, pain, warmth, drainage of pus, and fever. If this occurs, call this office. 4. Special Instructions (follow any that are checked): 
· [x] You have stitches that DO NOT need to be removed. · [x] Avoid bending at the waist and heavy lifting for two days. · [] Sleep with your head elevated for the next two nights. · [] Rest the surgery site and keep it elevated as much as possible for two days. · [x] You may apply an ice-pack for 10-15 minutes every waking hour for the rest of the day. · [] Eat a soft diet and avoid hot food and hot drinks for the rest of the day. · [] Other instructions: Follow up as directed. Take Tylenol or Ibuprofen for pain as needed. Once the site is healed with no remaining bandages or open areas, protect your surgical site and scar from the sun, as this area will be more sensitive. Use a broad spectrum sunscreen SPF 30 or higher daily, and a chemical free product (one containing zinc oxide or titanium dioxide) is a good choice if the area is sensitive. You may begin to gently massage the surgical site in 2-3 weeks, rubbing in a circular motion along the scar. This can help reduce swelling and thickness of a scar. A scar cream may be used beginnning 1 month after the surgery. If you have any questions or concerns, please call our office Monday through Friday at [phone]. Introducing [facility] & HEALTH SERVICES! Deagulshan Valdeznt: Thank you for requesting a [employer] account. Our records indicate that you already have an active [employer] account. You can access your account anytime at [email or web address] Did you know that you can access your hospital and ER discharge instructions at any time in [employer]? You can also review all of your test results from your hospital stay or ER visit. Additional Information If you have questions, please visit the Frequently Asked Questions section of the [employer] website at [email or web address] Remember, [employer] is NOT to be used for urgent needs. For medical emergencies, dial 911. Now available from your iPhone and Android! Please provide this summary of care documentation to your next provider. Your primary care clinician is listed as TOMMIE GILMORE. If you have any questions after today's visit, please call [phone].

## 2018-05-30 NOTE — PATIENT INSTRUCTIONS
WOUND CARE INSTRUCTIONS    1. Keep the dressing clean and dry and do not remove for 48 hours. 2. Then change the dressing once a day as follows:  a. Wash hands before and after each dressing change. b. Remove dressing and wash site gently with mild soap and water, rinse, and pat dry.  c. Apply an ointment (Bacitracin, Polysporin, Neosporin, Petroleum jelly or Aquaphor). d. Apply a non-stick (Telfa) dressing or Band-Aid to cover the wound. Remove pressure bandage on Friday, then wash site gently. Leave steri-strips in place until it naturally begins to peel off, about 4-6 days, then remove. After 7 days, if steri-strips remain, they may be removed. Vaseline may be applied at this point for one week. 3. Watch for:  BLEEDING: A small amount of drainage may occur. If bleeding occurs, elevate and rest the surgery site. Apply gauze and steady pressure for 15 minutes. If bleeding continues, call this office. INFECTION: Signs of infection include increased redness, pain, warmth, drainage of pus, and fever. If this occurs, call this office. 4. Special Instructions (follow any that are checked):  · [x] You have stitches that DO NOT need to be removed. · [x] Avoid bending at the waist and heavy lifting for two days. · [] Sleep with your head elevated for the next two nights. · [] Rest the surgery site and keep it elevated as much as possible for two days. · [x] You may apply an ice-pack for 10-15 minutes every waking hour for the rest of the day. · [] Eat a soft diet and avoid hot food and hot drinks for the rest of the day. · [] Other instructions: Follow up as directed. Take Tylenol or Ibuprofen for pain as needed. Once the site is healed with no remaining bandages or open areas, protect your surgical site and scar from the sun, as this area will be more sensitive.   Use a broad spectrum sunscreen SPF 30 or higher daily, and a chemical free product (one containing zinc oxide or titanium dioxide) is a good choice if the area is sensitive. You may begin to gently massage the surgical site in 2-3 weeks, rubbing in a circular motion along the scar. This can help reduce swelling and thickness of a scar. A scar cream may be used beginnning 1 month after the surgery. If you have any questions or concerns, please call our office Monday through Friday at [phone].

## 2018-08-06 ENCOUNTER — APPOINTMENT (OUTPATIENT)
Dept: CT IMAGING | Age: 73
End: 2018-08-06
Attending: EMERGENCY MEDICINE
Payer: MEDICARE

## 2018-08-06 ENCOUNTER — APPOINTMENT (OUTPATIENT)
Dept: GENERAL RADIOLOGY | Age: 73
End: 2018-08-06
Attending: EMERGENCY MEDICINE
Payer: MEDICARE

## 2018-08-06 ENCOUNTER — HOSPITAL ENCOUNTER (EMERGENCY)
Age: 73
Discharge: HOME | End: 2018-08-06
Attending: EMERGENCY MEDICINE
Payer: MEDICARE

## 2018-08-06 VITALS
DIASTOLIC BLOOD PRESSURE: 78 MMHG | RESPIRATION RATE: 12 BRPM | OXYGEN SATURATION: 98 % | SYSTOLIC BLOOD PRESSURE: 158 MMHG | HEART RATE: 56 BPM | BODY MASS INDEX: 28.55 KG/M2 | TEMPERATURE: 97.9 F | WEIGHT: 199 LBS

## 2018-08-06 DIAGNOSIS — I16.0 HYPERTENSIVE URGENCY: ICD-10-CM

## 2018-08-06 DIAGNOSIS — I10 ESSENTIAL HYPERTENSION: Primary | ICD-10-CM

## 2018-08-06 DIAGNOSIS — E86.0 DEHYDRATION: ICD-10-CM

## 2018-08-06 DIAGNOSIS — R79.89 ELEVATED SERUM CREATININE: ICD-10-CM

## 2018-08-06 DIAGNOSIS — R42 LIGHT HEADEDNESS: ICD-10-CM

## 2018-08-06 LAB
ALBUMIN SERPL-MCNC: 3.6 G/DL (ref 3.5–5)
ALBUMIN/GLOB SERPL: 1.1 {RATIO} (ref 1.1–2.2)
ALP SERPL-CCNC: 62 U/L (ref 45–117)
ALT SERPL-CCNC: 30 U/L (ref 12–78)
ANION GAP SERPL CALC-SCNC: 4 MMOL/L (ref 5–15)
AST SERPL-CCNC: 24 U/L (ref 15–37)
ATRIAL RATE: 51 BPM
BASOPHILS # BLD: 0 K/UL (ref 0–0.1)
BASOPHILS NFR BLD: 0 % (ref 0–1)
BILIRUB SERPL-MCNC: 0.7 MG/DL (ref 0.2–1)
BUN SERPL-MCNC: 27 MG/DL (ref 6–20)
BUN/CREAT SERPL: 20 (ref 12–20)
CALCIUM SERPL-MCNC: 8.7 MG/DL (ref 8.5–10.1)
CALCULATED P AXIS, ECG09: 46 DEGREES
CALCULATED R AXIS, ECG10: -27 DEGREES
CALCULATED T AXIS, ECG11: 41 DEGREES
CHLORIDE SERPL-SCNC: 104 MMOL/L (ref 97–108)
CO2 SERPL-SCNC: 30 MMOL/L (ref 21–32)
CREAT SERPL-MCNC: 1.33 MG/DL (ref 0.7–1.3)
DIAGNOSIS, 93000: NORMAL
DIFFERENTIAL METHOD BLD: ABNORMAL
EOSINOPHIL # BLD: 0.1 K/UL (ref 0–0.4)
EOSINOPHIL NFR BLD: 1 % (ref 0–7)
ERYTHROCYTE [DISTWIDTH] IN BLOOD BY AUTOMATED COUNT: 13 % (ref 11.5–14.5)
GLOBULIN SER CALC-MCNC: 3.4 G/DL (ref 2–4)
GLUCOSE SERPL-MCNC: 106 MG/DL (ref 65–100)
HCT VFR BLD AUTO: 41.3 % (ref 36.6–50.3)
HGB BLD-MCNC: 14.1 G/DL (ref 12.1–17)
IMM GRANULOCYTES # BLD: 0 K/UL (ref 0–0.04)
IMM GRANULOCYTES NFR BLD AUTO: 0 % (ref 0–0.5)
LYMPHOCYTES # BLD: 1.1 K/UL (ref 0.8–3.5)
LYMPHOCYTES NFR BLD: 11 % (ref 12–49)
MCH RBC QN AUTO: 31.8 PG (ref 26–34)
MCHC RBC AUTO-ENTMCNC: 34.1 G/DL (ref 30–36.5)
MCV RBC AUTO: 93 FL (ref 80–99)
MONOCYTES # BLD: 0.5 K/UL (ref 0–1)
MONOCYTES NFR BLD: 5 % (ref 5–13)
NEUTS SEG # BLD: 7.9 K/UL (ref 1.8–8)
NEUTS SEG NFR BLD: 82 % (ref 32–75)
NRBC # BLD: 0 K/UL (ref 0–0.01)
NRBC BLD-RTO: 0 PER 100 WBC
P-R INTERVAL, ECG05: 170 MS
PLATELET # BLD AUTO: 188 K/UL (ref 150–400)
PMV BLD AUTO: 9.6 FL (ref 8.9–12.9)
POTASSIUM SERPL-SCNC: 4.6 MMOL/L (ref 3.5–5.1)
PROT SERPL-MCNC: 7 G/DL (ref 6.4–8.2)
Q-T INTERVAL, ECG07: 422 MS
QRS DURATION, ECG06: 94 MS
QTC CALCULATION (BEZET), ECG08: 388 MS
RBC # BLD AUTO: 4.44 M/UL (ref 4.1–5.7)
SODIUM SERPL-SCNC: 138 MMOL/L (ref 136–145)
TROPONIN I BLD-MCNC: <0.04 NG/ML (ref 0–0.08)
TROPONIN I SERPL-MCNC: <0.05 NG/ML
VENTRICULAR RATE, ECG03: 51 BPM
WBC # BLD AUTO: 9.7 K/UL (ref 4.1–11.1)

## 2018-08-06 PROCEDURE — 93005 ELECTROCARDIOGRAM TRACING: CPT

## 2018-08-06 PROCEDURE — 96361 HYDRATE IV INFUSION ADD-ON: CPT

## 2018-08-06 PROCEDURE — 70450 CT HEAD/BRAIN W/O DYE: CPT

## 2018-08-06 PROCEDURE — 71046 X-RAY EXAM CHEST 2 VIEWS: CPT

## 2018-08-06 PROCEDURE — 74011250636 HC RX REV CODE- 250/636: Performed by: EMERGENCY MEDICINE

## 2018-08-06 PROCEDURE — 74011000250 HC RX REV CODE- 250: Performed by: EMERGENCY MEDICINE

## 2018-08-06 PROCEDURE — 84484 ASSAY OF TROPONIN QUANT: CPT | Performed by: EMERGENCY MEDICINE

## 2018-08-06 PROCEDURE — 85025 COMPLETE CBC W/AUTO DIFF WBC: CPT | Performed by: EMERGENCY MEDICINE

## 2018-08-06 PROCEDURE — 99285 EMERGENCY DEPT VISIT HI MDM: CPT

## 2018-08-06 PROCEDURE — 80053 COMPREHEN METABOLIC PANEL: CPT | Performed by: EMERGENCY MEDICINE

## 2018-08-06 PROCEDURE — 96374 THER/PROPH/DIAG INJ IV PUSH: CPT

## 2018-08-06 RX ORDER — FOLIC ACID 1 MG/1
1 TABLET ORAL DAILY
COMMUNITY

## 2018-08-06 RX ORDER — LABETALOL HYDROCHLORIDE 5 MG/ML
20 INJECTION, SOLUTION INTRAVENOUS
Status: COMPLETED | OUTPATIENT
Start: 2018-08-06 | End: 2018-08-06

## 2018-08-06 RX ORDER — GLUCOSAMINE/CHONDR SU A SOD 750-600 MG
1 TABLET ORAL DAILY
COMMUNITY

## 2018-08-06 RX ORDER — TROLAMINE SALICYLATE 10 G/100G
CREAM TOPICAL
COMMUNITY
End: 2019-12-17 | Stop reason: ALTCHOICE

## 2018-08-06 RX ADMIN — SODIUM CHLORIDE 500 ML: 900 INJECTION, SOLUTION INTRAVENOUS at 11:48

## 2018-08-06 RX ADMIN — LABETALOL HYDROCHLORIDE 20 MG: 5 INJECTION INTRAVENOUS at 11:44

## 2018-08-06 NOTE — ED NOTES
Bedside and Verbal shift change report given to Ambrose PEREZ (oncoming nurse) by Sharmaine Carrel (offgoing nurse). Report included the following information SBAR, ED Summary, MAR, Recent Results and Cardiac Rhythm Sinus Bernard Sinks '.

## 2018-08-06 NOTE — PROGRESS NOTES
[facility]: MED RECONCILIATION    Comments/Recommendations:    Patient reports compliance with most medications except his BP medication (lisinopril/HCTZ). He states that he ran out of medication (out of refills also) about a month ago. BP on presentation was 201/99 with a HR of 54. Patient stated that he has an appointment with Dr. Etta Jeff in September, but he will likely need a supplemental refill to get him through until then. Medications added:     · Topical analgesic cream (recently prescribed on 7/30)    Medications removed:    · Naproxen - uses diclofenac now    Medications adjusted:    · none    Information obtained from: patient, wife, Rx query    Significant PMH/Disease States:   Past Medical History:   Diagnosis Date    Arthritis     gout    Family history of skin cancer     Gout 11/13/2013    Hypertension     Radiation exposure     Skin cancer     Sun-damaged skin      Chief Complaint for this Admission:   Chief Complaint   Patient presents with    Dizziness     Allergies: Review of patient's allergies indicates no known allergies. Prior to Admission Medications:     Medication Documentation Review Audit       Reviewed by Dami Brannon PHARMD (Pharmacist) on 08/06/18 at 1115         Medication Sig Documenting Provider Last Dose Status Taking?      allopurinol (ZYLOPRIM) 100 mg tablet Take 1 Tab by mouth daily. Phyllis Collins MD 8/6/2018 am Active Yes    aspirin 81 mg chewable tablet Take 81 mg by mouth daily. Historical Provider 8/6/2018 am Active Yes    diclofenac EC (VOLTAREN) 75 mg EC tablet TAKE 1 TABLET TWICE DAILY Dilip Tamayo DO 8/6/2018 am Active Yes    folic acid (FOLVITE) 1 mg tablet Take 1 mg by mouth daily. Historical Provider 8/6/2018 am Active Yes    glucosamine/chondr bonner A sod (GLUCOSAMINE-CHONDROITIN) 750-600 mg tab Take 1 Tab by mouth daily.  Historical Provider 8/6/2018 am Active Yes    lisinopril-hydroCHLOROthiazide (PRINZIDE, ZESTORETIC) 10-12.5 mg per tablet TAKE 1 TABLET EVERY DAY Remi De La Rosa MD 7/6/2018 Unknown time Active Yes             Med Note ([facility] Aug 6, 2018 11:14 AM): Ran out of refills about a month ago. Has an appt with Dr. Nandini Forde in September but will need supplemental refill until then.      loratadine (CLARITIN) 10 mg tablet Take 1 Tab by mouth daily for 360 days. Remi De La Rosa MD 8/6/2018 am Active Yes    trolamine salicylate (ANALGESIC, TROLAMINE SALICYL,) 10 % topical cream Apply  to affected area daily as needed for Pain. Historical Provider 8/5/2018 Unknown time Active Yes                  Thank you for the consult,  Morgan Worrell [facility]

## 2018-08-06 NOTE — ED PROVIDER NOTES
HPI Comments: 75 y.o. male with past medical history significant for HTN, gout, skin cancer, and arthritis who presents to the ED with chief complaint of dizziness. Pt reports he was getting out of his car to go golfing this morning at about 0730 when he began feeling dizzy. Pt states his dizziness got progressively worse to the point he was unable to ambulate so he went to the clubhouse to lay down. Pt states his dizziness is accompanied by nausea, says he has generalized weakness and feels \"woozy. \" Pt states his dizziness is exacerbated by position changes. Pt denies feeling a spinning sensation. Pt denies any alleviating factors. Pt states he has been having intermittent dizzy spells recently that resolve on their own after about 15 minutes. Pt states his dizziness today feels similar to his previous dizzy spells but says his sx today were worse and have been persistent. Pt denies any recent falls or hitting his head. Pt states he stopped taking his anti-hypertensive medication about a month ago because he thought that his dizziness may have been due to a side effect. Pt denies hx of stroke, MI, or DVT/PE. Pt denies chest pain, unilateral weakness, numbness, speech difficulty, or syncope. There are no other acute medical complaints voiced at this time. Social Hx: Never smoker. Uses EtOH. . PCP: Guerrero Guerra MD    Note written by Johanny Burns, as dictated by Didier Salguero DO 11:02 AM     The history is provided by the patient and the spouse.         Past Medical History:   Diagnosis Date    Arthritis     gout    Family history of skin cancer     Gout 11/13/2013    Hypertension     Radiation exposure     Skin cancer     Sun-damaged skin        Past Surgical History:   Procedure Laterality Date    COLONOSCOPY  11/20/2014         HX COLONOSCOPY  2006 2015 1 polyp,     HX HEENT      oral surgery    HX MOHS PROCEDURES  05/30/2018    Recurrent BCC L chest by Dr. Ty Johnson No family history on file. Social History     Social History    Marital status: SINGLE     Spouse name: N/A    Number of children: N/A    Years of education: N/A     Occupational History    Not on file. Social History Main Topics    Smoking status: Never Smoker    Smokeless tobacco: Never Used    Alcohol use 1.0 oz/week     1 Glasses of wine, 1 Cans of beer per week      Comment: 2-3 times a week    Drug use: No    Sexual activity: Not on file     Other Topics Concern    Not on file     Social History Narrative         ALLERGIES: Review of patient's allergies indicates no known allergies. Review of Systems   Constitutional: Negative for activity change, appetite change, chills and fever. HENT: Negative for congestion, rhinorrhea, sinus pain, sneezing and sore throat. Eyes: Negative for photophobia and visual disturbance. Respiratory: Negative for cough and shortness of breath. Cardiovascular: Negative for chest pain. Gastrointestinal: Positive for nausea. Negative for abdominal pain, blood in stool, constipation, diarrhea and vomiting. Genitourinary: Negative for difficulty urinating, dysuria, flank pain, hematuria, penile pain and testicular pain. Musculoskeletal: Negative for arthralgias, back pain, myalgias and neck pain. Skin: Negative for rash and wound. Neurological: Positive for dizziness and weakness (generalized). Negative for syncope, speech difficulty, numbness and headaches. Psychiatric/Behavioral: Negative for self-injury and suicidal ideas. All other systems reviewed and are negative. Vitals:    08/06/18 1214 08/06/18 1215 08/06/18 1315 08/06/18 1400   BP:  173/83 167/80 158/78   Pulse: (!) 53 (!) 50 (!) 54 (!) 56   Resp: 11 (!) 7 12 12   Temp:       SpO2: 99%  98% 98%   Weight:                Physical Exam   Constitutional: He is oriented to person, place, and time. He appears well-developed and well-nourished. No distress. Well appearing.   No acute distress. HENT:   Head: Normocephalic and atraumatic. Mouth/Throat: Oropharynx is clear and moist.   Mucous membranes moist.    Eyes: Conjunctivae and EOM are normal. Pupils are equal, round, and reactive to light. Neck: Neck supple. Cardiovascular: Regular rhythm and normal heart sounds. No murmur heard. Slightly bradycardic. Pulmonary/Chest: Effort normal and breath sounds normal. He has no wheezes. He has no rhonchi. He has no rales. Abdominal: Soft. He exhibits no distension. There is no tenderness. Musculoskeletal: He exhibits no edema. Neurological: He is alert and oriented to person, place, and time. Cranial nerves intact. No dysarthria or facial droop. 5/5 strength in all 4 extremities with intact sensation. Intact finger to nose. Negative pronator drift. Normal heel to juárez. Negative Romberg's. Normal gait. Skin: Skin is warm and dry. He is not diaphoretic. Nursing note and vitals reviewed. Note written by Johanny Rojas, as dictated by No att. providers found 11:03 AM    MDM  75 y.o. male presents with noncompliance with his rx'd BP medication regimen with episodic dizziness sensation that seemed to be orthostatic in nature. No syncope, or falls. No chest pain, palpitations, though his sx do seem to be more associated with exertion. Exam significant for markedly elevated BP at 215/97, as well as mild bradycardia with rates in the mid 50's. AF, satting normally. No focal neuro deficits noted, reassuring physical exam with NAD noted. CXR neg for acute process. CT head negative     Labs were drawn and returned showing no leukocytosis, nor anemia, negative troponin x2, normal LFTs, but does show slightly elevated Cr at 1.33 and BUN 27.  Feel that this could be associated with dehydration, especially since he was out on the golf course in the summer heat during this episode, but also could be directly associated with HTN urgency/emergency. Given IV fluid bolus to rehydrate as well as IV labetalol, which significantly improved his blood pressure to the 502'X systolic. He denies any further symptoms, and was able to ambulate several times to the bathroom in the ED without difficulty. His labs were discussed with the patient and his wife at the bedside regarding admission for hydration and monitoring of his renal function and controlling his BP, vs discharge home with close outpatient f/u. Pt requests close outpatient follow up and states that he will likely be able to see his doctor within the next day or two to further discuss his sx and his blood pressure regimen. The importance of this was stressed strongly, encouraged PO hydration, and strict return precautions were given for worsening or concerns. Feel that he is safe for close outpatient follow up at this time. This was discussed with the pt and his wife at the bedside and they stated both understanding and agreement. ED Course       Procedures    ED EKG interpretation:  Rhythm: sinus bradycardia; and regular . Rate (approx.): 51; Axis: normal; ST/T wave: No ST or T wave abnormalities suggestive of ischemia.       Note written by Johanny Jimenez, as dictated by Kyaw Platt DO 11:41 AM

## 2018-08-09 ENCOUNTER — OFFICE VISIT (OUTPATIENT)
Dept: FAMILY MEDICINE CLINIC | Age: 73
End: 2018-08-09

## 2018-08-09 VITALS
HEART RATE: 80 BPM | TEMPERATURE: 98.3 F | RESPIRATION RATE: 17 BRPM | SYSTOLIC BLOOD PRESSURE: 153 MMHG | BODY MASS INDEX: 28.52 KG/M2 | WEIGHT: 199.2 LBS | OXYGEN SATURATION: 97 % | HEIGHT: 70 IN | DIASTOLIC BLOOD PRESSURE: 99 MMHG

## 2018-08-09 DIAGNOSIS — I10 ESSENTIAL HYPERTENSION, BENIGN: Primary | ICD-10-CM

## 2018-08-09 RX ORDER — LISINOPRIL 20 MG/1
20 TABLET ORAL DAILY
Qty: 90 TAB | Refills: 3 | Status: SHIPPED | OUTPATIENT
Start: 2018-08-09 | End: 2019-08-13 | Stop reason: SDUPTHER

## 2018-08-09 RX ORDER — LISINOPRIL 20 MG/1
20 TABLET ORAL DAILY
Qty: 30 TAB | Refills: 0 | Status: SHIPPED | OUTPATIENT
Start: 2018-08-09 | End: 2018-08-09 | Stop reason: SDUPTHER

## 2018-08-09 NOTE — PROGRESS NOTES
Chief Complaint   Patient presents with   [address] [place] [address] ED Follow-up     8/6/18 @ [facility] Dx Dehydration and elevated b/p     Pt seen in the office today for a Ed follow up. Pt also requesting medication refill. He states Marylou  has not sent his b/p rx as of yet  Pt is concerned with why he is dehydrated . He states he consumed Gatorade  and water daily  He also states he has been having bilateral ankle swelling this summer. Pt has been playing 3-4 rounds of golf a week. Pt was diagnosed with dehydration and elevated kidney function at the ER    Subjective: (As above and below)     Chief Complaint   Patient presents with   [address] [place] [address] ED Follow-up     8/6/18 @ [facility] Dx Dehydration and elevated b/p     he is a 75y.o. year old male who presents for evaluation. Reviewed PmHx, RxHx, FmHx, SocHx, AllgHx and updated in chart. Review of Systems - negative except as listed above    Objective:     Vitals:    08/09/18 1337   BP: (!) 153/99   Pulse: 80   Resp: 17   Temp: 98.3 °F (36.8 °C)   TempSrc: Oral   SpO2: 97%   Weight: 199 lb 3.2 oz (90.4 kg)   Height: 5' 10\" (1.778 m)     Physical Examination: General appearance - alert, well appearing, and in no distress  Mental status - normal mood, behavior, speech, dress, motor activity, and thought processes  Mouth - mucous membranes moist, pharynx normal without lesions  Chest - clear to auscultation, no wheezes, rales or rhonchi, symmetric air entry  Heart - normal rate, regular rhythm, normal S1, S2, no murmurs, rubs, clicks or gallops  Musculoskeletal - no joint tenderness, deformity or swelling  Extremities - peripheral pulses normal, no pedal edema, no clubbing or cyanosis    Assessment/ Plan:   1.  Essential hypertension, benign  -remove HCTZ from BP medication  -increase lisinopril to compensate  -prescriptions sent to local and mail order pharmacy  -recheck labs      Follow-up Disposition: As needed  I have discussed the diagnosis with the patient and the intended plan as seen in the above orders. The patient has received an after-visit summary and questions were answered concerning future plans.      Medication Side Effects and Warnings were discussed with patient: yes  Patient Labs were reviewed: yes  Patient Past Records were reviewed:  yes    Chalino Sherman M.D.

## 2018-08-09 NOTE — MR AVS SNAPSHOT
[address] [address] 
[phone] Patient: Durga Oseguera MRN: [id number] OHE:[birth year] Visit Information Date & Time Provider Department Dept. Phone Encounter #  
 8/9/2018  1:15 PM Mary Jo Clark MD [address] [facility] [phone] [id number] Your Appointments 10/2/2018 10:00 AM  
Office Visit with EZE Garcia [address]) Appt Note: 6 month skin exam  
 [facility] [address] [facility] [address]  
[facility] [address] Upcoming Health Maintenance Date Due DTaP/Tdap/Td series (1 - Tdap) 5/4/1965 Influenza Age 5 to Adult 8/1/2018 MEDICARE YEARLY EXAM 9/15/2018 GLAUCOMA SCREENING Q2Y 9/14/2019 COLONOSCOPY 8/10/2021 Allergies as of 8/9/2018  Review Complete On: 8/9/2018 By: Mary Jo Clark MD  
 No Known Allergies Current Immunizations  Reviewed on 8/30/2016 Name Date Influenza High Dose Vaccine PF 9/14/2017 Influenza Vaccine 9/25/2013 Pneumococcal Conjugate (PCV-13) 4/27/2015 Pneumococcal Vaccine (Unspecified Type) 2/20/2013 Zoster Vaccine, Live 2/20/2013 Not reviewed this visit You Were Diagnosed With   
  
 Codes Comments Essential hypertension, benign    -  Primary ICD-10-CM: I10 
ICD-9-CM: 401.1 Vitals BP Pulse Temp Resp Height(growth percentile) Weight(growth percentile) (!) 153/99 (BP 1 Location: Left arm, BP Patient Position: Sitting) 80 98.3 °F (36.8 °C) (Oral) 17 5' 10\" (1.778 m) 199 lb 3.2 oz (90.4 kg) SpO2 BMI Smoking Status 97% 28.58 kg/m2 Never Smoker Vitals History BMI and BSA Data Body Mass Index Body Surface Area 28.58 kg/m 2 2.11 m 2 Preferred Pharmacy Pharmacy Name Phone [address] [phone] Your Updated Medication List  
  
   
This list is accurate as of 8/9/18  2:06 PM.  Always use your most recent med list.  
  
  
  
  
 allopurinol 100 mg tablet Commonly known as:  العلي Better Take 1 Tab by mouth daily. ANALGESIC (TROLAMINE SALICYL) 10 % topical cream  
Generic drug:  trolamine salicylate Apply  to affected area daily as needed for Pain. aspirin 81 mg chewable tablet Take 81 mg by mouth daily. diclofenac EC 75 mg EC tablet Commonly known as:  VOLTAREN  
TAKE 1 TABLET TWICE DAILY  
  
 folic acid 1 mg tablet Commonly known as:  Google Take 1 mg by mouth daily. glucosamine-chondroitin 750-600 mg Tab Take 1 Tab by mouth daily. lisinopril 20 mg tablet Commonly known as:  Pyle Leonardo Take 1 Tab by mouth daily. loratadine 10 mg tablet Commonly known as:  Kaleigh Xin Take 1 Tab by mouth daily for 360 days. Prescriptions Sent to Pharmacy Refills  
 lisinopril (PRINIVIL, ZESTRIL) 20 mg tablet 3 Sig: Take 1 Tab by mouth daily. Class: Normal  
 Pharmacy: [address] [place] [address], [address] [place], [place] [address]Th [place] Ph #: [phone] Route: Oral  
  
We Performed the Following METABOLIC PANEL, COMPREHENSIVE [46655 CPT(R)] Introducing [facility] & [facility] SERVICES! Dear Yolanda Jensen: Thank you for requesting a SmartExposee account. Our records indicate that you already have an active SmartExposee account. You can access your account anytime at [email or web address] Did you know that you can access your hospital and ER discharge instructions at any time in SmartExposee? You can also review all of your test results from your hospital stay or ER visit. Additional Information If you have questions, please visit the Frequently Asked Questions section of the SmartExposee website at [email or web address] Remember, Presentainhart is NOT to be used for urgent needs. For medical emergencies, dial 911. Now available from your iPhone and Android! Please provide this summary of care documentation to your next provider. Your primary care clinician is listed as TOMMIE GILMORE. If you have any questions after today's visit, please call [phone].

## 2018-08-10 LAB
ALBUMIN SERPL-MCNC: 4.7 G/DL (ref 3.5–4.8)
ALBUMIN/GLOB SERPL: 1.7 {RATIO} (ref 1.2–2.2)
ALP SERPL-CCNC: 67 IU/L (ref 39–117)
ALT SERPL-CCNC: 24 IU/L (ref 0–44)
AST SERPL-CCNC: 23 IU/L (ref 0–40)
BILIRUB SERPL-MCNC: 0.6 MG/DL (ref 0–1.2)
BUN SERPL-MCNC: 21 MG/DL (ref 8–27)
BUN/CREAT SERPL: 14 (ref 10–24)
CALCIUM SERPL-MCNC: 10 MG/DL (ref 8.6–10.2)
CHLORIDE SERPL-SCNC: 99 MMOL/L (ref 96–106)
CO2 SERPL-SCNC: 23 MMOL/L (ref 20–29)
CREAT SERPL-MCNC: 1.45 MG/DL (ref 0.76–1.27)
GLOBULIN SER CALC-MCNC: 2.8 G/DL (ref 1.5–4.5)
GLUCOSE SERPL-MCNC: 99 MG/DL (ref 65–99)
INTERPRETATION: NORMAL
POTASSIUM SERPL-SCNC: 5 MMOL/L (ref 3.5–5.2)
PROT SERPL-MCNC: 7.5 G/DL (ref 6–8.5)
SODIUM SERPL-SCNC: 139 MMOL/L (ref 134–144)

## 2018-08-12 NOTE — PROGRESS NOTES
Kidney function remains slightly elevated  Recheck in 2 weeks following blood pressure medication changes. A message has been sent in [facility] and the lab work released to the patient.

## 2018-08-22 ENCOUNTER — OFFICE VISIT (OUTPATIENT)
Dept: FAMILY MEDICINE CLINIC | Age: 73
End: 2018-08-22

## 2018-08-22 VITALS
BODY MASS INDEX: 28.58 KG/M2 | OXYGEN SATURATION: 100 % | TEMPERATURE: 97.5 F | DIASTOLIC BLOOD PRESSURE: 82 MMHG | HEIGHT: 70 IN | HEART RATE: 59 BPM | WEIGHT: 199.6 LBS | RESPIRATION RATE: 16 BRPM | SYSTOLIC BLOOD PRESSURE: 132 MMHG

## 2018-08-22 DIAGNOSIS — J06.9 UPPER RESPIRATORY TRACT INFECTION, UNSPECIFIED TYPE: ICD-10-CM

## 2018-08-22 DIAGNOSIS — I10 ESSENTIAL HYPERTENSION, BENIGN: Primary | ICD-10-CM

## 2018-08-22 RX ORDER — LORATADINE 10 MG/1
10 TABLET ORAL DAILY
Qty: 30 TAB | Refills: 11 | Status: SHIPPED | OUTPATIENT
Start: 2018-08-22 | End: 2018-08-22 | Stop reason: SDUPTHER

## 2018-08-22 RX ORDER — LORATADINE 10 MG/1
10 TABLET ORAL DAILY
Qty: 90 TAB | Refills: 3 | Status: SHIPPED | OUTPATIENT
Start: 2018-08-22 | End: 2019-07-15 | Stop reason: SDUPTHER

## 2018-08-22 RX ORDER — INDOMETHACIN 25 MG/1
25 CAPSULE ORAL 3 TIMES DAILY
Qty: 90 CAP | Refills: 2 | Status: SHIPPED | OUTPATIENT
Start: 2018-08-22 | End: 2018-11-20

## 2018-08-22 NOTE — PROGRESS NOTES
Chief Complaint   Patient presents with    Hypertension    Gout     Indomethacin    Medication Refill     1. Have you been to the ER, urgent care clinic since your last visit? Hospitalized since your last visit? Yes Where: [facility] ED     2. Have you seen or consulted any other health care providers outside of the [address] [place] [address] since your last visit? Include any pap smears or colon screening. No      Better now after dehydration      Chief Complaint   Patient presents with    Hypertension    Gout     Indomethacin    Medication Refill     He is a 75 y.o. male who presents for evalution. Reviewed PmHx, RxHx, FmHx, SocHx, AllgHx and updated and dated in the chart. Patient Active Problem List    Diagnosis    Advance care planning     Has LW      Male erectile dysfunction    Essential hypertension, benign    Gout       Review of Systems - negative except as listed above in the HPI    Objective:     Vitals:    08/22/18 1607   BP: 132/82   Pulse: (!) 59   Resp: 16   Temp: 97.5 °F (36.4 °C)   TempSrc: Oral   SpO2: 100%   Weight: 199 lb 9.6 oz (90.5 kg)   Height: 5' 10\" (1.778 m)     Physical Examination: General appearance - alert, well appearing, and in no distress  Chest - clear to auscultation, no wheezes, rales or rhonchi, symmetric air entry  Heart - normal rate, regular rhythm, normal S1, S2, no murmurs, rubs, clicks or gallops    Assessment/ Plan:   Diagnoses and all orders for this visit:    1. Essential hypertension, benign  -     METABOLIC PANEL, BASIC  -at goal with new rx  -mild in Cr    2. Upper respiratory tract infection, unspecified type  -     loratadine (CLARITIN) 10 mg tablet; Take 1 Tab by mouth daily for 360 days. Follow-up Disposition:  Return if symptoms worsen or fail to improve. I have discussed the diagnosis with the patient and the intended plan as seen in the above orders. The patient understands and agrees with the plan.  The patient has received an after-visit summary and questions were answered concerning future plans. Medication Side Effects and Warnings were discussed with patient  Patient Labs were reviewed and or requested:  Patient Past Records were reviewed and or requested    Eugene Talbert M.D. There are no Patient Instructions on file for this visit.

## 2018-08-22 NOTE — MR AVS SNAPSHOT
[address] [address] 
[phone] Patient: Polo Maradiaga MRN: [id number] EAZ:[birth year] Visit Information Date & Time Provider Department Dept. Phone Encounter #  
 8/22/2018  3:00 PM Pita Anguiano MD [address]00 [facility] [phone] [id number] Follow-up Instructions Return if symptoms worsen or fail to improve. Your Appointments 10/2/2018 10:00 AM  
Office Visit with EZE Tavarez [address] [facility]) Appt Note: 6 month skin exam  
 [facility] [address] [address] Upcoming Health Maintenance Date Due DTaP/Tdap/Td series (1 - Tdap) 5/4/1965 Influenza Age 5 to Adult 8/1/2018 MEDICARE YEARLY EXAM 9/15/2018 GLAUCOMA SCREENING Q2Y 9/14/2019 COLONOSCOPY 8/10/2021 Allergies as of 8/22/2018  Review Complete On: 8/22/2018 By: Pita Anguiano MD  
 No Known Allergies Current Immunizations  Reviewed on 8/30/2016 Name Date Influenza High Dose Vaccine PF 9/14/2017 Influenza Vaccine 9/25/2013 Pneumococcal Conjugate (PCV-13) 4/27/2015 Pneumococcal Vaccine (Unspecified Type) 2/20/2013 Zoster Vaccine, Live 2/20/2013 Not reviewed this visit You Were Diagnosed With   
  
 Codes Comments Essential hypertension, benign    -  Primary ICD-10-CM: I10 
ICD-9-CM: 401.1 Upper respiratory tract infection, unspecified type     ICD-10-CM: J06.9 ICD-9-CM: 465.9 Vitals BP Pulse Temp Resp Height(growth percentile) Weight(growth percentile) 132/82 (!) 59 97.5 °F (36.4 °C) (Oral) 16 5' 10\" (1.778 m) 199 lb 9.6 oz (90.5 kg) SpO2 BMI Smoking Status 100% 28.64 kg/m2 Never Smoker BMI and BSA Data Body Mass Index Body Surface Area  
 28.64 kg/m 2 2.11 m 2 Preferred Pharmacy Pharmacy Name Phone [facility] [place] [facility] [address] [facility] [address] [facility] [address] [phone] Your Updated Medication List  
  
   
This list is accurate as of 8/22/18  4:17 PM.  Always use your most recent med list.  
  
  
  
  
 allopurinol 100 mg tablet Commonly known as:  Yuni Bolus Take 1 Tab by mouth daily. ANALGESIC (TROLAMINE SALICYL) 10 % topical cream  
Generic drug:  trolamine salicylate Apply  to affected area daily as needed for Pain. aspirin 81 mg chewable tablet Take 81 mg by mouth daily. diclofenac EC 75 mg EC tablet Commonly known as:  VOLTAREN  
TAKE 1 TABLET TWICE DAILY  
  
 folic acid 1 mg tablet Commonly known as:  Google Take 1 mg by mouth daily. glucosamine-chondroitin 750-600 mg Tab Take 1 Tab by mouth daily. INDOCIN PO Take 50 mg by mouth as needed. lisinopril 20 mg tablet Commonly known as:  Thersia Kubas Take 1 Tab by mouth daily. loratadine 10 mg tablet Commonly known as:  Mattyarline [place] Take 1 Tab by mouth daily for 360 days. Prescriptions Sent to Pharmacy Refills  
 loratadine (CLARITIN) 10 mg tablet 11 Sig: Take 1 Tab by mouth daily for 360 days. Class: Normal  
 Pharmacy: [address] [place] [address], [address] [place], [place] [address] Street Ph #: [phone] Route: Oral  
  
Follow-up Instructions Return if symptoms worsen or fail to improve. Introducing [facility] & HEALTH SERVICES! Dear Felipe Luna: Thank you for requesting a Locappy account. Our records indicate that you already have an active Locappy account. You can access your account anytime at [email or web address] Did you know that you can access your hospital and ER discharge instructions at any time in Locappy? You can also review all of your test results from your hospital stay or ER visit. Additional Information If you have questions, please visit the Frequently Asked Questions section of the Snehta website at [email or web address] Remember, Snehta is NOT to be used for urgent needs. For medical emergencies, dial 911. Now available from your iPhone and Android! Please provide this summary of care documentation to your next provider. Your primary care clinician is listed as TOMMIE GILMORE. If you have any questions after today's visit, please call [phone].

## 2018-08-23 LAB
BUN SERPL-MCNC: 25 MG/DL (ref 8–27)
BUN/CREAT SERPL: 17 (ref 10–24)
CALCIUM SERPL-MCNC: 9.6 MG/DL (ref 8.6–10.2)
CHLORIDE SERPL-SCNC: 103 MMOL/L (ref 96–106)
CO2 SERPL-SCNC: 23 MMOL/L (ref 20–29)
CREAT SERPL-MCNC: 1.47 MG/DL (ref 0.76–1.27)
GLUCOSE SERPL-MCNC: 90 MG/DL (ref 65–99)
INTERPRETATION: NORMAL
POTASSIUM SERPL-SCNC: 5.3 MMOL/L (ref 3.5–5.2)
SODIUM SERPL-SCNC: 143 MMOL/L (ref 134–144)

## 2018-09-10 ENCOUNTER — OFFICE VISIT (OUTPATIENT)
Dept: FAMILY MEDICINE CLINIC | Age: 73
End: 2018-09-10

## 2018-09-10 VITALS
HEART RATE: 63 BPM | SYSTOLIC BLOOD PRESSURE: 115 MMHG | TEMPERATURE: 98.3 F | BODY MASS INDEX: 28.67 KG/M2 | DIASTOLIC BLOOD PRESSURE: 76 MMHG | OXYGEN SATURATION: 99 % | HEIGHT: 70 IN | WEIGHT: 200.25 LBS | RESPIRATION RATE: 18 BRPM

## 2018-09-10 DIAGNOSIS — I10 ESSENTIAL HYPERTENSION, BENIGN: Primary | ICD-10-CM

## 2018-09-10 DIAGNOSIS — Z23 ENCOUNTER FOR IMMUNIZATION: ICD-10-CM

## 2018-09-10 NOTE — MR AVS SNAPSHOT
[address] [address] 
[phone] Patient: Lona Macedo MRN: [id number] JPJ:[birth year]33 Visit Information Date & Time Provider Department Dept. Phone Encounter #  
 9/10/2018  2:50 PM Katia Juarez  [facility] [phone] [id number] Follow-up Instructions Return if symptoms worsen or fail to improve. Your Appointments 10/2/2018 10:00 AM  
Office Visit with EZE Juarez [address] [place] [address]) Appt Note: 6 month skin exam  
 [address] [address] [address] [facility] [address]  
[facility] [address] [facility] [address] Upcoming Health Maintenance Date Due DTaP/Tdap/Td series (1 - Tdap) 5/4/1966 Influenza Age 5 to Adult 8/1/2018 MEDICARE YEARLY EXAM 9/15/2018 GLAUCOMA SCREENING Q2Y 9/14/2019 COLONOSCOPY 8/10/2021 Allergies as of 9/10/2018  Review Complete On: 9/10/2018 By: Katia Juarez MD  
 No Known Allergies Current Immunizations  Reviewed on 8/30/2016 Name Date Influenza High Dose Vaccine PF 9/14/2017 Influenza Vaccine 9/25/2013 Influenza Vaccine (Tri) Adjuvanted  Incomplete Pneumococcal Conjugate (PCV-13) 4/27/2015 Pneumococcal Vaccine (Unspecified Type) 2/20/2013 Tdap  Incomplete Zoster Vaccine, Live 2/20/2013 Not reviewed this visit You Were Diagnosed With   
  
 Codes Comments Essential hypertension, benign    -  Primary ICD-10-CM: I10 
ICD-9-CM: 401.1 Encounter for immunization     ICD-10-CM: G11 ICD-9-CM: V03.89 Vitals BP Pulse Temp Resp Height(growth percentile) Weight(growth percentile) 115/76 (BP 1 Location: Left arm, BP Patient Position: Sitting) 63 98.3 °F (36.8 °C) (Oral) 18 5' 10\" (1.778 m) 200 lb 4 oz (90.8 kg) SpO2 BMI Smoking Status 99% 28.73 kg/m2 Never Smoker Vitals History BMI and BSA Data Body Mass Index Body Surface Area 28.73 kg/m 2 2.12 m 2 Preferred Pharmacy Pharmacy Name Phone [facility] [address] [place] [address] [phone] Your Updated Medication List  
  
   
This list is accurate as of 9/10/18  3:47 PM.  Always use your most recent med list.  
  
  
  
  
 allopurinol 100 mg tablet Commonly known as:  Century Minor Take 1 Tab by mouth daily. ANALGESIC (TROLAMINE SALICYL) 10 % topical cream  
Generic drug:  trolamine salicylate Apply  to affected area daily as needed for Pain. aspirin 81 mg chewable tablet Take 81 mg by mouth daily. diclofenac EC 75 mg EC tablet Commonly known as:  VOLTAREN  
TAKE 1 TABLET TWICE DAILY  
  
 folic acid 1 mg tablet Commonly known as:  Google Take 1 mg by mouth daily. glucosamine-chondroitin 750-600 mg Tab Take 1 Tab by mouth daily. indomethacin 25 mg capsule Commonly known as:  INDOCIN Take 1 Cap by mouth three (3) times daily for 90 days. lisinopril 20 mg tablet Commonly known as:  Jenna Hu Take 1 Tab by mouth daily. loratadine 10 mg tablet Commonly known as:  Michaell Organ Take 1 Tab by mouth daily for 360 days. We Performed the Following ADMIN INFLUENZA VIRUS VAC [ HCPCS] INFLUENZA VACCINE INACTIVATED (IIV), SUBUNIT, ADJUVANTED, IM [id number] CPT(R)] TETANUS, DIPHTHERIA TOXOIDS AND ACELLULAR PERTUSSIS VACCINE (TDAP), IN INDIVIDS. >=7, IM [id number] CPT(R)] Follow-up Instructions Return if symptoms worsen or fail to improve. Introducing [facility] & HEALTH SERVICES! Dear Alvina Horn: Thank you for requesting a [employer] account. Our records indicate that you already have an active [employer] account. You can access your account anytime at [email or web address] Did you know that you can access your hospital and ER discharge instructions at any time in [employer]? You can also review all of your test results from your hospital stay or ER visit. Additional Information If you have questions, please visit the Frequently Asked Questions section of the [employer] website at [email or web address] Remember, [employer] is NOT to be used for urgent needs. For medical emergencies, dial 911. Now available from your iPhone and Android! Please provide this summary of care documentation to your next provider. Your primary care clinician is listed as TOMMIE GILMORE. If you have any questions after today's visit, please call [phone].

## 2018-09-10 NOTE — PROGRESS NOTES
1. Have you been to the ER, urgent care clinic since your last visit? Hospitalized since your last visit? No 
 
2. Have you seen or consulted any other health care providers outside of the [address] [place] [address] since your last visit? Include any pap smears or colon screening. No  
Chief Complaint Patient presents with  Injection Tdap/ Flu shot  Medication Problem  
  medication BP med questions- feels dizzy when getting up Chief Complaint Patient presents with  Injection Tdap/ Flu shot  Medication Problem  
  medication BP med questions- feels dizzy when getting up He is a 75 y.o. male who presents for evalution. Reviewed PmHx, RxHx, FmHx, SocHx, AllgHx and updated and dated in the chart. Patient Active Problem List  
 Diagnosis  Advance care planning Has LW 
  
 Male erectile dysfunction  Essential hypertension, benign  Gout Review of Systems - negative except as listed above in the HPI Objective:  
 
Vitals:  
 09/10/18 1514 BP: 115/76 Pulse: 63 Resp: 18 Temp: 98.3 °F (36.8 °C) TempSrc: Oral  
SpO2: 99% Weight: 200 lb 4 oz (90.8 kg) Height: 5' 10\" (1.778 m) Physical Examination: General appearance - alert, well appearing, and in no distress Chest - clear to auscultation, no wheezes, rales or rhonchi, symmetric air entry Heart - normal rate, regular rhythm, normal S1, S2, no murmurs, rubs, clicks or gallops Assessment/ Plan:  
Diagnoses and all orders for this visit: 1. Essential hypertension, benign -at goal 
 
2. Encounter for immunization -     Influenza Vaccine Inactivated (IIV)(FLUAD), Subunit, Adjuvanted, IM, (19686) -     Administration fee () for Medicare insured patients -     Tetanus, diphtheria toxoids and acellular pertussis (TDAP) vaccine, in individuals >=7 years, IM Follow-up Disposition: 
Return if symptoms worsen or fail to improve. I have discussed the diagnosis with the patient and the intended plan as seen in the above orders. The patient understands and agrees with the plan. The patient has received an after-visit summary and questions were answered concerning future plans. Medication Side Effects and Warnings were discussed with patient Patient Labs were reviewed and or requested: 
Patient Past Records were reviewed and or requested Eugene Talbert M.D. There are no Patient Instructions on file for this visit.

## 2018-10-02 ENCOUNTER — OFFICE VISIT (OUTPATIENT)
Dept: DERMATOLOGY | Facility: AMBULATORY SURGERY CENTER | Age: 73
End: 2018-10-02

## 2018-10-02 ENCOUNTER — HOSPITAL ENCOUNTER (OUTPATIENT)
Dept: LAB | Age: 73
Discharge: HOME | End: 2018-10-02

## 2018-10-02 VITALS
DIASTOLIC BLOOD PRESSURE: 80 MMHG | OXYGEN SATURATION: 98 % | WEIGHT: 200 LBS | HEIGHT: 70 IN | BODY MASS INDEX: 28.63 KG/M2 | SYSTOLIC BLOOD PRESSURE: 120 MMHG | TEMPERATURE: 98.2 F | HEART RATE: 52 BPM

## 2018-10-02 DIAGNOSIS — L82.1 SEBORRHEIC KERATOSES: ICD-10-CM

## 2018-10-02 DIAGNOSIS — D22.9 MULTIPLE BENIGN NEVI: ICD-10-CM

## 2018-10-02 DIAGNOSIS — D18.01 CHERRY ANGIOMA: ICD-10-CM

## 2018-10-02 DIAGNOSIS — Z85.828 HISTORY OF NONMELANOMA SKIN CANCER: ICD-10-CM

## 2018-10-02 DIAGNOSIS — D48.5 NEOPLASM OF UNCERTAIN BEHAVIOR OF SKIN OF CHEEK: Primary | ICD-10-CM

## 2018-10-02 DIAGNOSIS — D23.9 DERMATOFIBROMA: ICD-10-CM

## 2018-10-02 DIAGNOSIS — L81.4 LENTIGINES: ICD-10-CM

## 2018-10-02 RX ORDER — LISINOPRIL AND HYDROCHLOROTHIAZIDE 10; 12.5 MG/1; MG/1
TABLET ORAL
COMMUNITY
Start: 2018-08-06 | End: 2019-12-17 | Stop reason: ALTCHOICE

## 2018-10-02 NOTE — PROGRESS NOTES
Written by Cathie Treviño, as dictated by Yelena Brody, [address]. Name: Gavi Greene       Age: 75 y.o. Date: 10/2/2018    Chief Complaint:   Chief Complaint   Patient presents with    Skin Exam     6 month exam       Subjective:    HPI  Mr. Gavi Greene is a 75 y.o. male who presents for a full skin exam.  The patient's last skin exam was on 03/29/18 and the patient does not have current complaints related to his skin. He states he has been using a wide-brimmed hat to protect his face from the sun. He reports some trouble with his blood pressure medications in the past six months, however this has improved recently. He is feeling well and in his usual state of health today. He has no current illnesses, no other skin concerns. His allergies, medications, medical, and social history are reviewed by me today. The patient's pertinent skin history includes : Multiple NMSC, most recent recurrent BCC on the left chest (tx with Mohs 05/2018) and BCC on the left upper back (tx with curettage 03/2018). ROS: Constitutional: Negative. Dermatological : positive for - skin lesion changes    Social History     Social History    Marital status:      Spouse name: N/A    Number of children: N/A    Years of education: N/A     Occupational History    Not on file. Social History Main Topics    Smoking status: Never Smoker    Smokeless tobacco: Never Used    Alcohol use 1.0 oz/week     1 Glasses of wine, 1 Cans of beer per week      Comment: 2-3 times a week    Drug use: No    Sexual activity: No     Other Topics Concern    Not on file     Social History Narrative       History reviewed. No pertinent family history.     Past Medical History:   Diagnosis Date    Arthritis     gout    Family history of skin cancer     Gout 11/13/2013    Hypertension     Radiation exposure     Skin cancer     Sun-damaged skin        Past Surgical History:   Procedure Laterality Date    COLONOSCOPY  11/20/2014         HX COLONOSCOPY  2006 2015 1 polyp,     HX HEENT      oral surgery    HX MOHS PROCEDURES  05/30/2018    Recurrent BCC L chest by Dr. Demar Segura        Current Outpatient Prescriptions   Medication Sig Dispense Refill    lisinopril-hydroCHLOROthiazide (PRINZIDE, ZESTORETIC) 10-12.5 mg per tablet       indomethacin (INDOCIN) 25 mg capsule Take 1 Cap by mouth three (3) times daily for 90 days. 90 Cap 2    loratadine (CLARITIN) 10 mg tablet Take 1 Tab by mouth daily for 360 days. 90 Tab 3    folic acid (FOLVITE) 1 mg tablet Take 1 mg by mouth daily.  glucosamine/chondr bonner A sod (GLUCOSAMINE-CHONDROITIN) 750-600 mg tab Take 1 Tab by mouth daily.  trolamine salicylate (ANALGESIC, TROLAMINE SALICYL,) 10 % topical cream Apply  to affected area daily as needed for Pain.  allopurinol (ZYLOPRIM) 100 mg tablet Take 1 Tab by mouth daily. 90 Tab 3    diclofenac EC (VOLTAREN) 75 mg EC tablet TAKE 1 TABLET TWICE DAILY 180 Tab 5    aspirin 81 mg chewable tablet Take 81 mg by mouth daily.  lisinopril (PRINIVIL, ZESTRIL) 20 mg tablet Take 1 Tab by mouth daily. 90 Tab 3       No Known Allergies      Objective:    Visit Vitals    /80 (BP 1 Location: Left arm, BP Patient Position: Sitting)    Pulse (!) 52    Temp 98.2 °F (36.8 °C) (Oral)    Ht 5' 10\" (1.778 m)    Wt 200 lb (90.7 kg)    SpO2 98%    BMI 28.7 kg/m2       Clark Alcazar is a 75 y.o. male who appears well and in no distress. He is awake, alert, and oriented. There is no preauricular, submandibular, or cervical lymphadenopathy. A skin examination was performed including his scalp, face (including eyelids), ears, neck, chest, back, abdomen, upper extremities (including digits/nails), lower extremities; buttocks and genital skin was not examined. He has multiple well-healed scars, including on his left chest and left upper back, without evidence of lesion recurrence.  He has a 4 x 3 mm pink shiny macule on his right cheek, concerning for basal cell carcinoma. There are lentigines on sun exposed areas. He has scattered waxy macules and keratotic papules consistent with seborrheic keratoses. He has pink intradermal nevi and brown junctional nevi, no concerning features for severe atypia. He has scattered red papules consistent with cherry angiomas. He has a pink and brown papule on his left posterior knee consistent with a dermatofibroma. Right cheek    Assessment/Plan:    1. Personal history of nonmelanoma skin cancer. I discussed sun protection, sunscreen use, the warning signs of skin cancer, the need for self-skin examinations, and the need for regular practitioner exams every 6 months. The patient should follow up sooner as needed if new, changing, or symptomatic skin lesions arise. 2. Neoplasm of Uncertain Behavior, right cheek, favor BCC. The differential diagnoses were discussed. A shave biopsy was advised to sample this lesion. The procedure was reviewed and verbal and written consent were obtained. The risks of pain, bleeding, infection, and scar were discussed. The patient is aware that this is a sample and is intended for diagnosis and not therapy of the skin lesion. I performed the procedure. The site was cleansed and anesthetized with 1% Lidocaine with Epinephrine 1:100,000. A shave biopsy was performed to sample the lesion. Drysol was used for hemostasis. The wound was bandaged and care reviewed. The specimen was sent to pathology. I will contact the patient with the results and any further treatment that may be necessary. 3. Solar lentigos. The diagnosis and relationship to sun exposure was reviewed. Sun protection advised. 4. Seborrheic keratoses. The diagnosis was reviewed and the patient was reassured that no treatment is needed for these benign lesions. 5. Normal nevi. The diagnosis of normal nevi was reviewed.   I discussed sun protection, sunscreen use, the warning signs of skin cancer, the need for self-skin examinations, and the need for regular practitioner exams every 6 months. The patient should follow up sooner as needed if new, changing, or symptomatic skin lesions arise. 6. Cherry angiomas. The diagnosis was reviewed and the patient was reassured that no treatment is needed for these benign lesions. 7. Dermatofibroma. The diagnosis was reviewed and the patient was reassured that no treatment is needed for these benign conditions at this time. The patient should follow up should the lesion change or become symptomatic. This plan was reviewed with the patient and patient agrees. All questions were answered. This scribe documentation was reviewed by me and accurately reflects the examination and decisions made by me. [facility]   OFFICE PROCEDURE PROGRESS NOTE   Chart reviewed for the following:   Shellie LOPEZ, have reviewed the History, Physical and updated the Allergic reactions for Papa Gallardo. TIME OUT performed immediately prior to start of procedure:   Clau LOPEZ, [address], have performed the following reviews on Papa Gallardo   prior to the start of the procedure:     * Patient was identified by name and date of birth   * Agreement on procedure being performed was verified   * Risks and Benefits explained to the patient   * Procedure site verified and marked as necessary   * Patient was positioned for comfort   * Consent was signed and verified     Time: 10:10 AM  Date of procedure: 10/2/2018  Procedure performed by: Rachael Smiley  Provider assisted by: Clifton Rashid LPN   Patient assisted by: self   How tolerated by patient: tolerated the procedure well with no complications   Comments: none

## 2018-10-02 NOTE — MR AVS SNAPSHOT
[address] [facility] [address] [address] [facility] [address] [facility] 
[phone] Patient: Saba Luu MRN: [id number] LGS:[birth year] Visit Information Date & Time Provider Department Dept. Phone Encounter #  
 10/2/2018 10:00 AM EZE Chaidez [phone] Your Appointments 10/2/2018 10:00 AM  
Office Visit with EZE Chaidez 8057 [facility] Appt Note: 6 month skin exam  
 [facility] [address] [facility] [address] [address]  
[address] [facility] [address] [facility] [address] [address] Upcoming Health Maintenance Date Due Shingrix Vaccine Age 50> (1 of 2) 5/4/1994 MEDICARE YEARLY EXAM 9/15/2018 GLAUCOMA SCREENING Q2Y 9/14/2019 COLONOSCOPY 8/10/2021 DTaP/Tdap/Td series (2 - Td) 9/10/2028 Allergies as of 10/2/2018  Review Complete On: 10/2/2018 By: Vince Baker LPN No Known Allergies Current Immunizations  Reviewed on 8/30/2016 Name Date Influenza High Dose Vaccine PF 9/14/2017 Influenza Vaccine 9/25/2013 Influenza Vaccine (Tri) Adjuvanted 9/10/2018 Pneumococcal Conjugate (PCV-13) 4/27/2015 Pneumococcal Vaccine (Unspecified Type) 2/20/2013 Tdap 9/10/2018 Zoster Vaccine, Live 2/20/2013 Not reviewed this visit Vitals BP Pulse Temp Height(growth percentile) Weight(growth percentile) SpO2  
 120/80 (BP 1 Location: Left arm, BP Patient Position: Sitting) (!) 52 98.2 °F (36.8 °C) (Oral) 5' 10\" (1.778 m) 200 lb (90.7 kg) 98% BMI Smoking Status 28.7 kg/m2 Never Smoker BMI and BSA Data Body Mass Index Body Surface Area 28.7 kg/m 2 2.12 m 2 Preferred Pharmacy Pharmacy Name Phone [address] [place] [address] [address] [facility] [address] [phone] Your Updated Medication List  
  
   
 This list is accurate as of 10/2/18  9:55 AM.  Always use your most recent med list.  
  
  
  
  
 allopurinol 100 mg tablet Commonly known as:  Shoaib Marshall Take 1 Tab by mouth daily. ANALGESIC (TROLAMINE SALICYL) 10 % topical cream  
Generic drug:  trolamine salicylate Apply  to affected area daily as needed for Pain. aspirin 81 mg chewable tablet Take 81 mg by mouth daily. diclofenac EC 75 mg EC tablet Commonly known as:  VOLTAREN  
TAKE 1 TABLET TWICE DAILY  
  
 folic acid 1 mg tablet Commonly known as:  Google Take 1 mg by mouth daily. glucosamine-chondroitin 750-600 mg Tab Take 1 Tab by mouth daily. indomethacin 25 mg capsule Commonly known as:  INDOCIN Take 1 Cap by mouth three (3) times daily for 90 days. lisinopril 20 mg tablet Commonly known as:  Kenneth Faustina Take 1 Tab by mouth daily. lisinopril-hydroCHLOROthiazide 10-12.5 mg per tablet Commonly known as:  PRINZIDE, ZESTORETIC  
  
 loratadine 10 mg tablet Commonly known as:  Krunal Ali Take 1 Tab by mouth daily for 360 days. Patient Instructions Self Skin Exam and Sunscreens Early detection and treatment is essential in the treatment of all forms of skin cancer. If caught early, all forms of skin cancer are curable. In addition to your regular visits, you should perform a monthly skin examination. Over time, you become familiar with what is normally found on your skin and can identify new or suspicious spots. One of the screening tools you can use to assess your skin is to follow the ABCDEs: 
 
A= Asymmetry (One half is unlike the other half) B= Border (An irregular, scalloped or poorly defined edge) C= Color (Is varied from one area to another, has shades of tan, brown/ black,       white, red or blue) D= Diameter (Spots larger than 6mm or a pencil eraser) E= Evolving (New spots or one that is changing in size, shape, or color) A follow- up interval will be customized based on your history of skin cancer or level of skin damage and risk factors. In any case, if you notice a suspicious or new spot, an appointment should be arranged between regular visits. Everyone should use sunscreen and sun-safe practices, which is especially important for those with a personal or family history of skin cancer. Suggestions for this include: 1. Use daily moisturizers containing SPF 30 or higher. 2. Wear long sleeve clothing with UPF ratings and a broad-brimmed hat. 3. Apply sunscreen with SPF 30 or higher to all sun exposed areas if you are going to be in the sun. A broad spectrum UVA/ UVB sunscreen is best.  Dont forget to REAPPLY every two hours or more often if swimming or sweating! 4. Avoid outside activities during peak sun hours, especially in the summer (10am- 2pm). 5. DO NOT use tanning beds. Using sunscreen and sun-safe practices can help reduce the likelihood of developing skin cancer or additional skin cancers in those previously diagnosed. Introducing [facility] & HEALTH SERVICES! Dear Luis Hung: Thank you for requesting a [employer] account. Our records indicate that you already have an active [employer] account. You can access your account anytime at [email or web address] Did you know that you can access your hospital and ER discharge instructions at any time in [employer]? You can also review all of your test results from your hospital stay or ER visit. Additional Information If you have questions, please visit the Frequently Asked Questions section of the [employer] website at [email or web address] Remember, [employer] is NOT to be used for urgent needs. For medical emergencies, dial 911. Now available from your iPhone and Android! Please provide this summary of care documentation to your next provider. Your primary care clinician is listed as TOMMIE GILMORE.  If you have any questions after today's visit, please call [phone].

## 2018-10-11 NOTE — PROGRESS NOTES
I spoke with the patient and he is doing well post bx. He understands the diagnosis. I discussed cream vs mohs and s/e / cure rates. He would like to proceed with Mohs. He would like to use Dr. Bryon Álvarez at [address]. I will forward records.

## 2019-01-24 ENCOUNTER — OFFICE VISIT (OUTPATIENT)
Dept: FAMILY MEDICINE CLINIC | Age: 74
End: 2019-01-24

## 2019-01-24 VITALS
RESPIRATION RATE: 15 BRPM | BODY MASS INDEX: 28.63 KG/M2 | WEIGHT: 200 LBS | OXYGEN SATURATION: 98 % | SYSTOLIC BLOOD PRESSURE: 118 MMHG | DIASTOLIC BLOOD PRESSURE: 79 MMHG | TEMPERATURE: 97.6 F | HEIGHT: 70 IN | HEART RATE: 59 BPM

## 2019-01-24 DIAGNOSIS — Z71.89 ADVANCE CARE PLANNING: ICD-10-CM

## 2019-01-24 DIAGNOSIS — I10 ESSENTIAL HYPERTENSION, BENIGN: ICD-10-CM

## 2019-01-24 DIAGNOSIS — Z00.00 ROUTINE GENERAL MEDICAL EXAMINATION AT A HEALTH CARE FACILITY: Primary | ICD-10-CM

## 2019-01-24 DIAGNOSIS — R73.9 ELEVATED BLOOD SUGAR: ICD-10-CM

## 2019-01-24 DIAGNOSIS — Z12.5 PROSTATE CANCER SCREENING: ICD-10-CM

## 2019-01-24 RX ORDER — TRIAMCINOLONE ACETONIDE 1 MG/G
CREAM TOPICAL 2 TIMES DAILY
Qty: 45 G | Refills: 3 | Status: SHIPPED | OUTPATIENT
Start: 2019-01-24 | End: 2019-12-17 | Stop reason: ALTCHOICE

## 2019-01-24 NOTE — PROGRESS NOTES
Chief Complaint   Patient presents with    Complete Physical     [facility] Due: 9/15/18-[employer] Request Triamcinilone Cream 1%    Hypertension    Hearing Problem     Left ear     1. Have you been to the ER, urgent care clinic since your last visit? Hospitalized since your last visit? No    2. Have you seen or consulted any other health care providers outside of the [address] [place] [address] since your last visit? Include any pap smears or colon screening. No     Complete Physical Exam  Pre-Visit Questions:    1. Do you follow a low fat diet?  no  2. Are you up to date on your Tdap (<10 years)?  no  3. Have you ever had a Pneumovax vaccine?  no  4. Do you follow an exercise program?  yes  5. Do you smoke?  no  6. Do you consider yourself overweight?  yes  7. Do you Testicular self exam?  yes  8. Is there a family history of CAD< age 48?  no  5. Is there a family history of Cancer?  no  10. Do you know your Cancer risks? yes  11. Have you had a colonoscopy? Yes      Medicare Wellness Exam:    Chief Complaint   Patient presents with    Complete Physical     [facility] Due: 9/15/18-[employer] Request Triamcinilone Cream 1%    Hypertension    Hearing Problem     Left ear     he is a 75y.o. year old male who presents for evaluation for their Medicare Wellness Visit. Alicia Luther is completed and assessed=yes  Depression Screen is completed and assessed=yes  Medication list reviewed and adjusted for accuracy=yes  Immunizations reviewed and updated=yes  Health/Preventative Screenings reviewed and updated=yes  ADL Functions reviewed=yes    Patient Active Problem List    Diagnosis    Advance care planning     Has LW      Male erectile dysfunction    Essential hypertension, benign    Gout       Reviewed PmHx, RxHx, FmHx, SocHx, AllgHx and updated and dated in the chart.     Review of Systems - negative except as listed above in the HPI    Objective:     Vitals:    01/24/19 0811   BP: 118/79   Pulse: (!) 59   Resp: 15 Temp: 97.6 °F (36.4 °C)   TempSrc: Oral   SpO2: 98%   Weight: 200 lb (90.7 kg)   Height: 5' 10\" (1.778 m)     Physical Examination: General appearance - alert, well appearing, and in no distress  Nose - normal and patent, no erythema, discharge or polyps  Neck - supple, no significant adenopathy  Lymphatics - no palpable lymphadenopathy, no hepatosplenomegaly  Chest - clear to auscultation, no wheezes, rales or rhonchi, symmetric air entry  Heart - normal rate, regular rhythm, normal S1, S2, no murmurs, rubs, clicks or gallops  Abdomen - soft, nontender, nondistended, no masses or organomegaly  Extremities - peripheral pulses normal, no pedal edema, no clubbing or cyanosis    Assessment/ Plan:   Diagnoses and all orders for this visit:    1. Routine general medical examination at a health care facility  -see below    2. Essential hypertension, benign  -     LIPID PANEL  -     METABOLIC PANEL, COMPREHENSIVE  -     CBC WITH AUTOMATED DIFF  -at goal    3. Advance care planning  -has a LW    4. Elevated blood sugar  -     METABOLIC PANEL, COMPREHENSIVE  -     TSH 3RD GENERATION  -     HEMOGLOBIN A1C WITH EAG    5. Prostate cancer screening  -     PSA, DIAGNOSTIC (PROSTATE SPECIFIC AG)    Other orders  -     triamcinolone acetonide (KENALOG) 0.1 % topical cream; Apply  to affected area two (2) times a day.          -Pain evaluation performed in office  -Cognitive Screen performed in office  -Depression Screen, Fall risks (by up and go test)  and ADL functionality were addressed  -Medication list updated and reviewed for any changes   -A comprehensive review of medical issues and a plan was formulated  -End of life planning was addressed with pt   -Health Screenings for preventions were addressed and a plan was formulated  -Shingles Vaccine was recommended  -Discussed with patient cancer risk factors and appropriate screenings for age  -Patient evaluated for colonoscopy and referred if needed per screeing criteria  -Labs from previous visits were discussed with patient   -Discussed with patient diet and exercise and formulated a plan as needed  -An Advanced care plan was developed with the patient.  -Alcohol screening performed and was negative    -Follow-up Disposition:  Return in about 1 year (around 1/24/2020). I have discussed the diagnosis with the patient and the intended plan as seen in the above orders. The patient understands and agrees with the plan. The patient has received an after-visit summary and questions were answered concerning future plans. Medication Side Effects and Warnings were discussed with patien  Patient Labs were reviewed and or requested  Patient Past Records were reviewed and or requested    There are no Patient Instructions on file for this visit.       Gael Sánchez M.D.

## 2019-01-25 LAB
ALBUMIN SERPL-MCNC: 4.4 G/DL (ref 3.5–4.8)
ALBUMIN/GLOB SERPL: 2 {RATIO} (ref 1.2–2.2)
ALP SERPL-CCNC: 62 IU/L (ref 39–117)
ALT SERPL-CCNC: 24 IU/L (ref 0–44)
AST SERPL-CCNC: 22 IU/L (ref 0–40)
BASOPHILS # BLD AUTO: 0 X10E3/UL (ref 0–0.2)
BASOPHILS NFR BLD AUTO: 0 %
BILIRUB SERPL-MCNC: 0.5 MG/DL (ref 0–1.2)
BUN SERPL-MCNC: 26 MG/DL (ref 8–27)
BUN/CREAT SERPL: 22 (ref 10–24)
CALCIUM SERPL-MCNC: 9.4 MG/DL (ref 8.6–10.2)
CHLORIDE SERPL-SCNC: 101 MMOL/L (ref 96–106)
CHOLEST SERPL-MCNC: 195 MG/DL (ref 100–199)
CO2 SERPL-SCNC: 25 MMOL/L (ref 20–29)
CREAT SERPL-MCNC: 1.2 MG/DL (ref 0.76–1.27)
EOSINOPHIL # BLD AUTO: 0.2 X10E3/UL (ref 0–0.4)
EOSINOPHIL NFR BLD AUTO: 3 %
ERYTHROCYTE [DISTWIDTH] IN BLOOD BY AUTOMATED COUNT: 14.1 % (ref 12.3–15.4)
EST. AVERAGE GLUCOSE BLD GHB EST-MCNC: 105 MG/DL
GLOBULIN SER CALC-MCNC: 2.2 G/DL (ref 1.5–4.5)
GLUCOSE SERPL-MCNC: 90 MG/DL (ref 65–99)
HBA1C MFR BLD: 5.3 % (ref 4.8–5.6)
HCT VFR BLD AUTO: 42.3 % (ref 37.5–51)
HDLC SERPL-MCNC: 48 MG/DL
HGB BLD-MCNC: 14.6 G/DL (ref 13–17.7)
IMM GRANULOCYTES # BLD AUTO: 0 X10E3/UL (ref 0–0.1)
IMM GRANULOCYTES NFR BLD AUTO: 0 %
INTERPRETATION, 910389: NORMAL
INTERPRETATION: NORMAL
LDLC SERPL CALC-MCNC: 136 MG/DL (ref 0–99)
LYMPHOCYTES # BLD AUTO: 1.5 X10E3/UL (ref 0.7–3.1)
LYMPHOCYTES NFR BLD AUTO: 22 %
MCH RBC QN AUTO: 31.6 PG (ref 26.6–33)
MCHC RBC AUTO-ENTMCNC: 34.5 G/DL (ref 31.5–35.7)
MCV RBC AUTO: 92 FL (ref 79–97)
MONOCYTES # BLD AUTO: 0.5 X10E3/UL (ref 0.1–0.9)
MONOCYTES NFR BLD AUTO: 8 %
NEUTROPHILS # BLD AUTO: 4.4 X10E3/UL (ref 1.4–7)
NEUTROPHILS NFR BLD AUTO: 67 %
PDF IMAGE, 910387: NORMAL
PLATELET # BLD AUTO: 249 X10E3/UL (ref 150–379)
POTASSIUM SERPL-SCNC: 5 MMOL/L (ref 3.5–5.2)
PROT SERPL-MCNC: 6.6 G/DL (ref 6–8.5)
PSA SERPL-MCNC: 2 NG/ML (ref 0–4)
RBC # BLD AUTO: 4.62 X10E6/UL (ref 4.14–5.8)
SODIUM SERPL-SCNC: 139 MMOL/L (ref 134–144)
TRIGL SERPL-MCNC: 57 MG/DL (ref 0–149)
TSH SERPL DL<=0.005 MIU/L-ACNC: 1.1 UIU/ML (ref 0.45–4.5)
VLDLC SERPL CALC-MCNC: 11 MG/DL (ref 5–40)
WBC # BLD AUTO: 6.7 X10E3/UL (ref 3.4–10.8)

## 2019-04-02 RX ORDER — SILDENAFIL 50 MG/1
50 TABLET, FILM COATED ORAL AS NEEDED
Qty: 5 TAB | Refills: 0 | Status: SHIPPED | OUTPATIENT
Start: 2019-04-02

## 2019-05-07 ENCOUNTER — OFFICE VISIT (OUTPATIENT)
Dept: DERMATOLOGY | Facility: AMBULATORY SURGERY CENTER | Age: 74
End: 2019-05-07

## 2019-05-07 VITALS
OXYGEN SATURATION: 99 % | DIASTOLIC BLOOD PRESSURE: 80 MMHG | HEIGHT: 70 IN | WEIGHT: 198 LBS | BODY MASS INDEX: 28.35 KG/M2 | RESPIRATION RATE: 20 BRPM | HEART RATE: 72 BPM | TEMPERATURE: 97.8 F | SYSTOLIC BLOOD PRESSURE: 134 MMHG

## 2019-05-07 DIAGNOSIS — D23.9 DERMATOFIBROMA: ICD-10-CM

## 2019-05-07 DIAGNOSIS — L82.1 SEBORRHEIC KERATOSES: Primary | ICD-10-CM

## 2019-05-07 DIAGNOSIS — D18.01 CHERRY ANGIOMA: ICD-10-CM

## 2019-05-07 DIAGNOSIS — L72.0 EPIDERMAL INCLUSION CYST: ICD-10-CM

## 2019-05-07 DIAGNOSIS — Z85.828 HISTORY OF NONMELANOMA SKIN CANCER: ICD-10-CM

## 2019-05-07 DIAGNOSIS — L70.0 OPEN COMEDONE: ICD-10-CM

## 2019-05-07 DIAGNOSIS — L81.4 LENTIGINES: ICD-10-CM

## 2019-05-07 DIAGNOSIS — D22.9 MULTIPLE BENIGN NEVI: ICD-10-CM

## 2019-05-07 NOTE — PROGRESS NOTES
Chief Complaint   Patient presents with    Follow-up     6 month skin exam.       1. Have you been to the ER, urgent care clinic since your last visit? Hospitalized since your last visit?no    2. Have you seen or consulted any other health care providers outside of the [address] [place] [address] since your last visit? Include any pap smears or colon screening. No    Discussed advanced directive. Patient states that he does not have an advanced directive.

## 2019-05-07 NOTE — PROGRESS NOTES
Written by Savi Haas, as dictated by Juan Raygoza Dust, [address]. Name: Pipo Hopper       Age: 75 y.o. Date: 5/7/2019    Chief Complaint:   Chief Complaint   Patient presents with    Follow-up     6 month skin exam.       Subjective:    HPI  Mr. Pipo Hopper is a 75 y.o. male who presents for a full skin exam.  The patient's last skin exam was on 10/02/18 and the patient does have current complaints related to his skin. He notes two lesion on the right flank that he feels. He states he has gained some color from being in the sun, however he uses sunscreen and a wide brimmed hat regularly. He denies bleeding and itching. He is feeling well and in his usual state of health today. He has no current illnesses, no other skin concerns. His allergies, medications, medical, and social history are reviewed by me today. The patient's pertinent skin history includes : personal history of multiple NMSC  -BCC, right cheek, Mohs, 11/06/18  -Recurrent BCC, left chest, Mohs, 05/30/18  -BCC, left upper back, curettage, 03/29/18  -BCC, right shoulder, excision, 08/21/17  -BCC, right mid back, excision, 08/21/17  -BCC, mid upper back, curettage, 06/19/17  -BCC, mid lower back, curettage, 06/19/17  -BCC, left posterior shoulder, curettage, 06/19/17  -BCC, left upper back, curettage, 06/19/17  -Hx 8-10 NMSCs prior to first visit (all non-facial)    ROS: Constitutional: Negative.     Dermatological : negative    Social History     Socioeconomic History    Marital status:      Spouse name: Not on file    Number of children: Not on file    Years of education: Not on file    Highest education level: Not on file   Occupational History    Not on file   Social Needs    Financial resource strain: Not on file    Food insecurity:     Worry: Not on file     Inability: Not on file    Transportation needs:     Medical: Not on file     Non-medical: Not on file   Tobacco Use    Smoking status: Never Smoker    Smokeless tobacco: Never Used   Substance and Sexual Activity    Alcohol use: Yes     Alcohol/week: 1.0 oz     Types: 1 Glasses of wine, 1 Cans of beer per week     Comment: 2-3 times a week    Drug use: No    Sexual activity: Never   Lifestyle    Physical activity:     Days per week: Not on file     Minutes per session: Not on file    Stress: Not on file   Relationships    Social connections:     Talks on phone: Not on file     Gets together: Not on file     Attends Buddhist service: Not on file     Active member of club or organization: Not on file     Attends meetings of clubs or organizations: Not on file     Relationship status: Not on file    Intimate partner violence:     Fear of current or ex partner: Not on file     Emotionally abused: Not on file     Physically abused: Not on file     Forced sexual activity: Not on file   Other Topics Concern    Not on file   Social History Narrative    Not on file       History reviewed. No pertinent family history. Past Medical History:   Diagnosis Date    Arthritis     gout    Family history of skin cancer     Gout 11/13/2013    Hypertension     Radiation exposure     Skin cancer     Sun-damaged skin        Past Surgical History:   Procedure Laterality Date    COLONOSCOPY  11/20/2014         HX COLONOSCOPY  2006 2015 1 polyp,     HX HEENT      oral surgery    HX MOHS PROCEDURES  05/30/2018    Recurrent BCC L chest by Dr. Susan Nieto        Current Outpatient Medications   Medication Sig Dispense Refill    sildenafil citrate (VIAGRA) 50 mg tablet Take 1 Tab by mouth as needed (ED). Indications: Inability to have an Erection 5 Tab 0    triamcinolone acetonide (KENALOG) 0.1 % topical cream Apply  to affected area two (2) times a day. 45 g 3    lisinopril-hydroCHLOROthiazide (PRINZIDE, ZESTORETIC) 10-12.5 mg per tablet       loratadine (CLARITIN) 10 mg tablet Take 1 Tab by mouth daily for 360 days.  90 Tab 3    lisinopril (PRINIVIL, ZESTRIL) 20 mg tablet Take 1 Tab by mouth daily. 90 Tab 3    folic acid (FOLVITE) 1 mg tablet Take 1 mg by mouth daily.  glucosamine/chondr bonner A sod (GLUCOSAMINE-CHONDROITIN) 750-600 mg tab Take 1 Tab by mouth daily.  trolamine salicylate (ANALGESIC, TROLAMINE SALICYL,) 10 % topical cream Apply  to affected area daily as needed for Pain.  allopurinol (ZYLOPRIM) 100 mg tablet Take 1 Tab by mouth daily. 90 Tab 3    diclofenac EC (VOLTAREN) 75 mg EC tablet TAKE 1 TABLET TWICE DAILY 180 Tab 5    aspirin 81 mg chewable tablet Take 81 mg by mouth daily. No Known Allergies      Objective:    Visit Vitals  /80 (BP 1 Location: Left arm, BP Patient Position: Sitting)   Pulse 72   Temp 97.8 °F (36.6 °C) (Oral)   Resp 20   Ht 5' 10\" (1.778 m)   Wt 198 lb (89.8 kg)   SpO2 99%   BMI 28.41 kg/m²       Joseph Díaz is a 75 y.o. male who appears well and in no distress. He is awake, alert, and oriented. There is no preauricular, submandibular, or cervical lymphadenopathy. A skin examination was performed including his scalp, face (including eyelids), ears, neck, chest, back, abdomen, upper extremities (including digits/nails), lower extremities (mid thighs to ankles), breasts. There are multiple well-healed scars without evidence of lesion recurrence. There are lentigines on sun exposed areas. There are mobile cystic structures on the right posterior neck/scalp, posterior hairline, and left mid back, consistent with epidermal inclusion cysts. He has scattered waxy macules and keratotic papules consistent with seborrheic keratoses, including the lesions of concern on the left flank. He has pink intradermal nevi and brown junctional nevi, no concerning features for severe atypia. He has scattered red papules consistent with cherry angiomas. He has a firm pink and brown papule on the right posterior knee consistent with dermatofibroma. He has open comedones on the posterior neck. Assessment/Plan:    1. Personal history of nonmelanoma skin cancer. I discussed sun protection, sunscreen use, the warning signs of skin cancer, the need for self-skin examinations, and the need for regular practitioner exams every 6 months. The patient should follow up sooner as needed if new, changing, or symptomatic skin lesions arise. 2. Solar lentigos. The diagnosis and relationship to sun exposure was reviewed. Sun protection advised. 3. Epidermal inclusion cysts. The diagnosis was discussed. The patient was reassured that no treatment is necessary at this time. We discussed excision of these lesions if pt desires. 4. Seborrheic keratoses. The diagnosis was reviewed and the patient was reassured that no treatment is needed for these benign lesions. 5. Normal nevi. The diagnosis of normal nevi was reviewed. I discussed sun protection, sunscreen use, the warning signs of skin cancer, the need for self-skin examinations, and the need for regular practitioner exams every 6 months. The patient should follow up sooner as needed if new, changing, or symptomatic skin lesions arise. 6. Cherry angiomas. The diagnosis was reviewed and the patient was reassured that no treatment is needed for these benign lesions. 7. Dermatofibroma. The diagnosis was reviewed and the patient was reassured that no treatment is needed for these benign conditions at this time. The patient should follow up should the lesion change or become symptomatic. 8. Open comedone. The diagnosis was discussed. The patient was reassured that no treatment is necessary at this time. This plan was reviewed with the patient and patient agrees. All questions were answered. This scribe documentation was reviewed by me and accurately reflects the examination and decisions made by me.

## 2019-05-20 ENCOUNTER — TELEPHONE (OUTPATIENT)
Dept: FAMILY MEDICINE CLINIC | Age: 74
End: 2019-05-20

## 2019-05-20 DIAGNOSIS — M10.9 GOUT, UNSPECIFIED CAUSE, UNSPECIFIED CHRONICITY, UNSPECIFIED SITE: ICD-10-CM

## 2019-05-20 RX ORDER — ALLOPURINOL 100 MG/1
100 TABLET ORAL DAILY
Qty: 90 TAB | Refills: 3 | Status: SHIPPED | OUTPATIENT
Start: 2019-05-20 | End: 2020-06-29

## 2019-05-20 RX ORDER — ALLOPURINOL 100 MG/1
100 TABLET ORAL DAILY
Qty: 90 TAB | Refills: 3 | Status: SHIPPED | OUTPATIENT
Start: 2019-05-20 | End: 2019-05-20 | Stop reason: SDUPTHER

## 2019-06-29 ENCOUNTER — HOSPITAL ENCOUNTER (OUTPATIENT)
Dept: CT IMAGING | Age: 74
Discharge: HOME | End: 2019-06-29
Payer: SELF-PAY

## 2019-06-29 DIAGNOSIS — Z00.00 PREVENTATIVE HEALTH CARE: ICD-10-CM

## 2019-06-29 PROCEDURE — 75571 CT HRT W/O DYE W/CA TEST: CPT

## 2019-07-02 NOTE — CARDIO/PULMONARY
Reached patient at his given home/mobile number and shared his coronary artery calcium score of 44 with him. We discussed the meaning of this score. Patient plans to follow up with his PCP, Dr. Ailyn Montoya, at his next visit. Patient has no further questions at this time.

## 2019-08-13 RX ORDER — LISINOPRIL 20 MG/1
TABLET ORAL
Qty: 90 TAB | Refills: 3 | Status: SHIPPED | OUTPATIENT
Start: 2019-08-13 | End: 2020-08-06

## 2019-10-29 DIAGNOSIS — J06.9 UPPER RESPIRATORY TRACT INFECTION, UNSPECIFIED TYPE: ICD-10-CM

## 2019-10-29 RX ORDER — LORATADINE 10 MG/1
10 TABLET ORAL DAILY
Qty: 90 TAB | Refills: 3 | Status: SHIPPED | OUTPATIENT
Start: 2019-10-29 | End: 2020-01-14 | Stop reason: SDUPTHER

## 2019-11-12 ENCOUNTER — OFFICE VISIT (OUTPATIENT)
Dept: DERMATOLOGY | Facility: AMBULATORY SURGERY CENTER | Age: 74
End: 2019-11-12

## 2019-11-12 VITALS
OXYGEN SATURATION: 98 % | WEIGHT: 198 LBS | BODY MASS INDEX: 28.35 KG/M2 | DIASTOLIC BLOOD PRESSURE: 90 MMHG | SYSTOLIC BLOOD PRESSURE: 144 MMHG | HEART RATE: 64 BPM | RESPIRATION RATE: 16 BRPM | HEIGHT: 70 IN | TEMPERATURE: 98.5 F

## 2019-11-12 DIAGNOSIS — L70.0 OPEN COMEDONE: ICD-10-CM

## 2019-11-12 DIAGNOSIS — D23.9 DERMATOFIBROMA: ICD-10-CM

## 2019-11-12 DIAGNOSIS — L57.0 ACTINIC KERATOSES: Primary | ICD-10-CM

## 2019-11-12 DIAGNOSIS — D22.9 MULTIPLE BENIGN NEVI: ICD-10-CM

## 2019-11-12 DIAGNOSIS — D18.01 CHERRY ANGIOMA: ICD-10-CM

## 2019-11-12 DIAGNOSIS — L82.1 SEBORRHEIC KERATOSES: ICD-10-CM

## 2019-11-12 DIAGNOSIS — Z85.828 HISTORY OF NONMELANOMA SKIN CANCER: ICD-10-CM

## 2019-11-12 DIAGNOSIS — L81.4 LENTIGINES: ICD-10-CM

## 2019-11-12 NOTE — PROGRESS NOTES
Written by Trudy Allison, as dictated by Kan Navarro, [address]. Name: Claudell Schwartz       Age: 75 y.o. Date: 11/12/2019    Chief Complaint: No chief complaint on file. Subjective:    HPI  Mr. Claudell Schwartz is a 75 y.o. male who presents for a   full skin exam.  The patient's last skin exam was on 5/7/19 and the patient does not have current complaints related to his skin. He is feeling well and in his usual state of health today. He has no current illnesses, no other skin concerns. His allergies, medications, medical, and social history are reviewed by me today. He reports using sun screen and a wide brimmed hat with golf. The patient's pertinent skin history includes : personal history of multiple NMSCs  -BCC, right cheek, Mohs, 11/06/18  -Recurrent BCC, left chest, Mohs, 05/30/18  -BCC, left upper back, curettage, 03/29/18  -BCC, right shoulder, excision, 08/21/17  -BCC, right mid back, excision, 08/21/17  -BCC, mid upper back, curettage, 06/19/17  -BCC, mid lower back, curettage, 06/19/17  -BCC, left posterior shoulder, curettage, 06/19/17  -BCC, left upper back, curettage, 06/19/17  -Hx 8-10 NMSCs prior to first visit (all non-facial)    ROS: Constitutional: Negative. Dermatological : negative    Social History     Socioeconomic History    Marital status:      Spouse name: Not on file    Number of children: Not on file    Years of education: Not on file    Highest education level: Not on file   Occupational History    Not on file   Social Needs    Financial resource strain: Not on file    Food insecurity:     Worry: Not on file     Inability: Not on file    Transportation needs:     Medical: Not on file     Non-medical: Not on file   Tobacco Use    Smoking status: Never Smoker    Smokeless tobacco: Never Used   Substance and Sexual Activity    Alcohol use:  Yes     Alcohol/week: 1.7 standard drinks     Types: 1 Glasses of wine, 1 Cans of beer per week Comment: 2-3 times a week    Drug use: No    Sexual activity: Never   Lifestyle    Physical activity:     Days per week: Not on file     Minutes per session: Not on file    Stress: Not on file   Relationships    Social connections:     Talks on phone: Not on file     Gets together: Not on file     Attends Amish service: Not on file     Active member of club or organization: Not on file     Attends meetings of clubs or organizations: Not on file     Relationship status: Not on file    Intimate partner violence:     Fear of current or ex partner: Not on file     Emotionally abused: Not on file     Physically abused: Not on file     Forced sexual activity: Not on file   Other Topics Concern    Not on file   Social History Narrative    Not on file       No family history on file. Past Medical History:   Diagnosis Date    Arthritis     gout    Family history of skin cancer     Gout 11/13/2013    Hypertension     Radiation exposure     Skin cancer     Sun-damaged skin        Past Surgical History:   Procedure Laterality Date    COLONOSCOPY  11/20/2014         HX COLONOSCOPY  2006 2015 1 polyp,     HX HEENT      oral surgery    HX MOHS PROCEDURES  05/30/2018    Recurrent BCC L chest by Dr. Leah Lemus        Current Outpatient Medications   Medication Sig Dispense Refill    loratadine (CLARITIN) 10 mg tablet Take 1 Tab by mouth daily for 360 days. 90 Tab 3    lisinopril (PRINIVIL, ZESTRIL) 20 mg tablet TAKE 1 TABLET EVERY DAY 90 Tab 3    allopurinol (ZYLOPRIM) 100 mg tablet Take 1 Tab by mouth daily. 90 Tab 3    sildenafil citrate (VIAGRA) 50 mg tablet Take 1 Tab by mouth as needed (ED). Indications: Inability to have an Erection 5 Tab 0    triamcinolone acetonide (KENALOG) 0.1 % topical cream Apply  to affected area two (2) times a day. 45 g 3    folic acid (FOLVITE) 1 mg tablet Take 1 mg by mouth daily.       glucosamine/chondr bonner A sod (GLUCOSAMINE-CHONDROITIN) 750-600 mg tab Take 1 Tab by mouth daily.  lisinopril-hydroCHLOROthiazide (PRINZIDE, ZESTORETIC) 10-12.5 mg per tablet       trolamine salicylate (ANALGESIC, TROLAMINE SALICYL,) 10 % topical cream Apply  to affected area daily as needed for Pain.  diclofenac EC (VOLTAREN) 75 mg EC tablet TAKE 1 TABLET TWICE DAILY (Patient not taking: Reported on 11/12/2019) 180 Tab 5    aspirin 81 mg chewable tablet Take 81 mg by mouth daily. No Known Allergies      Objective:    Visit Vitals  /90 (BP 1 Location: Left arm, BP Patient Position: Sitting)   Pulse 64   Temp 98.5 °F (36.9 °C) (Oral)   Resp 16   Ht 5' 10\" (1.778 m)   Wt 198 lb (89.8 kg)   SpO2 98%   BMI 28.41 kg/m²       Jenny Hawkins is a 75 y.o. male who appears well and in no distress. He is awake, alert, and oriented. There is no preauricular, submandibular, or cervical lymphadenopathy. A skin examination was performed including his scalp, face (including eyelids), ears, neck, chest, back, abdomen, upper extremities (including digits/nails), lower extremities, breasts, buttocks; genital skin was not examined. He has multiple well healed scars without evidence of lesion recurrence except possibly as noted below. There are lentigines on sun exposed areas. He has pink and tan intradermal nevi and brown junctional nevi, no concerning features for severe atypia. He has scattered red papules consistent with cherry angiomas. He has pink and brown firm papules most consistent with dermatofibromas on the right and left legs. He has thin scaled actinic keratoses on the left forehead and the mid scalp x 2. He has an open comedone on the left ear. On the left upper back, he has a pink macule within a scar possible inflammation vs recurrence - photographed for further monitoring. He has a pink macule on the lower back concerning for inflammation vs BCC - photographed for further monitoring.      Photos from today's visit:       Left upper back  Lower back    Assessment/Plan:  1. Personal history of nonmelanoma skin cancer. I discussed sun protection, sunscreen use, the warning signs of skin cancer, the need for self-skin examinations, and the need for regular practitioner exams. The patient should follow up sooner as needed if new, changing, or symptomatic skin lesions arise. 2. Solar lentigos. The diagnosis and relationship to sun exposure was reviewed. Sun protection advised. 3. Seborrheic keratoses. The diagnosis was reviewed and the patient was reassured that no treatment is needed for these benign lesions. 4. Normal nevi. The diagnosis of normal nevi was reviewed. I discussed sun protection, sunscreen use, the warning signs of skin cancer, mole monitoring, the need for self-skin examinations, and the need for regular practitioner exams. The patient should follow up sooner as needed if new, changing, or symptomatic skin lesions arise. 5. Cherry angiomas. The diagnosis was reviewed and the patient was reassured that no treatment is needed for these benign lesions. 6. Dermatofibroma. The diagnosis was reviewed and the patient was reassured that no treatment is needed for these benign conditions at this time. The patient should follow up should the lesion change or become symptomatic. 7. Actinic Keratoses. The diagnosis of this precancerous lesion related to sun exposure was reviewed. Verbal consent was obtained. I treated 3 lesions with cryotherapy and post-cryotherapy care was reviewed. 8. Open comedone. The diagnosis was discussed and the patient desires removal. After verbal permission, the area was cleansed with Alcohol and the material successfully extracted with a comedone extractor. Next skin exam:  6 months    This plan was reviewed with the patient and patient agrees. All questions were answered.     This scribe documentation was reviewed by me and accurately reflects the examination and decisions made by carolina Rivero [facility]   OFFICE PROCEDURE PROGRESS NOTE   Chart reviewed for the following:   Clau LOPEZ, have reviewed the History, Physical and updated the Allergic reactions for Tresea Mega. TIME OUT performed immediately prior to start of procedure:   Clau LOPEZ, [address], have performed the following reviews on Tresea Mega   prior to the start of the procedure:     * Patient was identified by name and date of birth   * Agreement on procedure being performed was verified   * Risks and Benefits explained to the patient   * Procedure site verified and marked as necessary   * Patient was positioned for comfort   * Consent was signed and verified     Time: 11:55 AM  Date of procedure: 11/12/2019  Procedure performed by: Benedict Andre.  Mario Miller DNP  Provider assisted by: self   Patient assisted by: self   How tolerated by patient: tolerated the procedure well with no complications   Comments: none

## 2019-12-17 ENCOUNTER — OFFICE VISIT (OUTPATIENT)
Dept: FAMILY MEDICINE CLINIC | Age: 74
End: 2019-12-17

## 2019-12-17 VITALS
BODY MASS INDEX: 29.2 KG/M2 | HEART RATE: 64 BPM | TEMPERATURE: 97.9 F | WEIGHT: 204 LBS | HEIGHT: 70 IN | RESPIRATION RATE: 20 BRPM | OXYGEN SATURATION: 98 % | SYSTOLIC BLOOD PRESSURE: 130 MMHG | DIASTOLIC BLOOD PRESSURE: 80 MMHG

## 2019-12-17 DIAGNOSIS — I10 ESSENTIAL HYPERTENSION, BENIGN: Primary | ICD-10-CM

## 2019-12-17 RX ORDER — DICLOFENAC SODIUM 75 MG/1
75 TABLET, DELAYED RELEASE ORAL 2 TIMES DAILY
Qty: 60 TAB | Refills: 2 | Status: SHIPPED | OUTPATIENT
Start: 2019-12-17 | End: 2020-01-14 | Stop reason: SDUPTHER

## 2019-12-17 NOTE — PROGRESS NOTES
Patient here for cpe, fasting labs. 1. Have you been to the ER, urgent care clinic since your last visit? Hospitalized since your last visit? No    2. Have you seen or consulted any other health care providers outside of the [address] [place] [address] since your last visit? Include any pap smears or colon screening. No   Complete Physical Exam  Pre-Visit Questions:    1. Do you follow a low fat diet? yes  2. Are you up to date on your Tdap (<10 years)? yes  3. Have you ever had a Pneumovax vaccine?  yes  4. Do you follow an exercise program?  yes  5. Do you smoke?  no  6. Do you consider yourself overweight?  yes  7. Do you Testicular self exam?  no  8. Is there a family history of CAD< age 48?  no  5. Is there a family history of Cancer?  no  10. Do you know your Cancer risks?  no  11. Have you had a colonoscopy? Yes  4-5 yrs ago, did not need another one      Chief Complaint   Patient presents with    Complete Physical     He is a 75 y.o. male who presents for evalution. Reviewed PmHx, RxHx, FmHx, SocHx, AllgHx and updated and dated in the chart. Patient Active Problem List    Diagnosis    Advance care planning     Has LW      Male erectile dysfunction    Essential hypertension, benign    Gout       Review of Systems - negative except as listed above in the HPI    Objective:     Vitals:    12/17/19 0813 12/17/19 0854   BP: 147/89 130/80   Pulse: 64    Resp: 20    Temp: 97.9 °F (36.6 °C)    SpO2: 98%    Weight: 204 lb (92.5 kg)    Height: 5' 10\" (1.778 m)      Physical Examination: General appearance - alert, well appearing, and in no distress  Lymphatics - no palpable lymphadenopathy, no hepatosplenomegaly  Chest - clear to auscultation, no wheezes, rales or rhonchi, symmetric air entry  Heart - normal rate, regular rhythm, normal S1, S2, no murmurs, rubs, clicks or gallops    Assessment/ Plan:   Diagnoses and all orders for this visit:    1.  Essential hypertension, benign  -at goal  -too soon for cpx  -labs utd       Follow-up and Dispositions    · Return in about 3 months (around 3/17/2020). I have discussed the diagnosis with the patient and the intended plan as seen in the above orders. The patient understands and agrees with the plan. The patient has received an after-visit summary and questions were answered concerning future plans. Medication Side Effects and Warnings were discussed with patient  Patient Labs were reviewed and or requested:  Patient Past Records were reviewed and or requested    Kelly Delgado M.D. There are no Patient Instructions on file for this visit.

## 2020-01-14 DIAGNOSIS — J06.9 UPPER RESPIRATORY TRACT INFECTION, UNSPECIFIED TYPE: ICD-10-CM

## 2020-01-14 RX ORDER — DICLOFENAC SODIUM 75 MG/1
75 TABLET, DELAYED RELEASE ORAL 2 TIMES DAILY
Qty: 180 TAB | Refills: 3 | Status: SHIPPED | OUTPATIENT
Start: 2020-01-14 | End: 2020-01-28

## 2020-01-14 RX ORDER — LORATADINE 10 MG/1
10 TABLET ORAL DAILY
Qty: 90 TAB | Refills: 3 | Status: SHIPPED | OUTPATIENT
Start: 2020-01-14 | End: 2021-01-08

## 2020-05-19 NOTE — PROGRESS NOTES
Name: Greer Peterson       Age: 67 y.o. Date: 5/20/20    Chief Complaint: skin exam, recheck upper and lower back    Subjective:    HPI  Mr. Greer Peterson is a 67 y.o. male who presents for a full skin exam.  The patient's last skin exam was on 11/12/19 and the patient does not have current complaints related to his skin. The patient's pertinent skin history includes :personal history of multiple NMSCs  -BCC, right cheek, Mohs, 11/06/18  -Recurrent BCC, left chest, Mohs, 05/30/18  -BCC, left upper back, curettage, 03/29/18  -BCC, right shoulder, excision, 08/21/17  -BCC, right mid back, excision, 08/21/17  -BCC, mid upper back, curettage, 06/19/17  -BCC, mid lower back, curettage, 06/19/17  -BCC, left posterior shoulder, curettage, 06/19/17  -BCC, left upper back, curettage, 06/19/17  -Hx 8-10 NMSCs prior to first visit (all non-facial)       ROS: Constitutional: Negative. Dermatological : negative      Social History     Socioeconomic History    Marital status:      Spouse name: Not on file    Number of children: Not on file    Years of education: Not on file    Highest education level: Not on file   Occupational History    Not on file   Social Needs    Financial resource strain: Not on file    Food insecurity     Worry: Not on file     Inability: Not on file    Transportation needs     Medical: Not on file     Non-medical: Not on file   Tobacco Use    Smoking status: Never Smoker    Smokeless tobacco: Never Used   Substance and Sexual Activity    Alcohol use:  Yes     Alcohol/week: 1.7 standard drinks     Types: 1 Glasses of wine, 1 Cans of beer per week     Comment: 2-3 times a week    Drug use: No    Sexual activity: Never   Lifestyle    Physical activity     Days per week: Not on file     Minutes per session: Not on file    Stress: Not on file   Relationships    Social connections     Talks on phone: Not on file     Gets together: Not on file     Attends Restorationist service: Not on file     Active member of club or organization: Not on file     Attends meetings of clubs or organizations: Not on file     Relationship status: Not on file    Intimate partner violence     Fear of current or ex partner: Not on file     Emotionally abused: Not on file     Physically abused: Not on file     Forced sexual activity: Not on file   Other Topics Concern    Not on file   Social History Narrative    Not on file       Family History   Problem Relation Age of Onset    Psychiatric Disorder Mother     Lung Disease Father        Past Medical History:   Diagnosis Date    Arthritis     gout    Family history of skin cancer     Gout 11/13/2013    Hypertension     Radiation exposure     Skin cancer     Sun-damaged skin        Past Surgical History:   Procedure Laterality Date    COLONOSCOPY  11/20/2014         HX COLONOSCOPY  2006 2015 1 polyp,     HX HEENT      oral surgery    HX MOHS PROCEDURES  05/30/2018    Recurrent BCC L chest by Dr. Naomi Cuellar        Current Outpatient Medications   Medication Sig Dispense Refill    loratadine (CLARITIN) 10 mg tablet Take 1 Tab by mouth daily for 360 days. 90 Tab 3    lisinopril (PRINIVIL, ZESTRIL) 20 mg tablet TAKE 1 TABLET EVERY DAY 90 Tab 3    allopurinol (ZYLOPRIM) 100 mg tablet Take 1 Tab by mouth daily. 90 Tab 3    sildenafil citrate (VIAGRA) 50 mg tablet Take 1 Tab by mouth as needed (ED). Indications: Inability to have an Erection 5 Tab 0    folic acid (FOLVITE) 1 mg tablet Take 1 mg by mouth daily.  glucosamine/chondr bnoner A sod (GLUCOSAMINE-CHONDROITIN) 750-600 mg tab Take 1 Tab by mouth daily.  diclofenac EC (VOLTAREN) 75 mg EC tablet          No Known Allergies      Objective:    Visit Vitals  Pulse (!) 57   Ht 5' 10\" (1.778 m)   Wt 86.2 kg (190 lb)   SpO2 97%   BMI 27.26 kg/m²       Marion Martin is a 67 y.o. male who appears well and in no distress. He is awake, alert, and oriented.   There is no preauricular, submandibular, or cervical lymphadenopathy. A skin examination was performed including his scalp, face (including eyelids), ears, neck, chest, back, abdomen, upper extremities (including digits/nails), lower extremities, breasts, buttocks; genital skin was not examined. He has multiple well healed scars without evidence of lesion recurrence except possibly as noted below. There are lentigines on sun exposed areas. He has pink and tan intradermal nevi and brown junctional nevi, no concerning features for severe atypia. He has scattered red papules consistent with cherry angiomas. He has pink and brown firm papules most consistent with dermatofibromas on the right and left legs. There are pink macules with telangietasias on the right shoulder (8x 5mm), right upper back (4x4mm) and left lower back (6x4 mm) concerning for BCC. He has one thin scaled AK on the left preauricular skin. Neck swelling on left noted. Photos from today's visit:                   Assessment/Plan:  1. Personal history of nonmelanoma skin cancer. I discussed sun protection, sunscreen use, the warning signs of skin cancer, the need for self-skin examinations, and the need for regular practitioner exams. The patient should follow up sooner as needed if new, changing, or symptomatic skin lesions arise.     2. Solar lentigos. The diagnosis and relationship to sun exposure was reviewed. Sun protection advised.     3. Seborrheic keratoses. The diagnosis was reviewed and the patient was reassured that no treatment is needed for these benign lesions.     4. Normal nevi. The diagnosis of normal nevi was reviewed. I discussed sun protection, sunscreen use, the warning signs of skin cancer, mole monitoring, the need for self-skin examinations, and the need for regular practitioner exams. The patient should follow up sooner as needed if new, changing, or symptomatic skin lesions arise.     5. Cherry angiomas.   The diagnosis was reviewed and the patient was reassured that no treatment is needed for these benign lesions.     6. Dermatofibroma. The diagnosis was reviewed and the patient was reassured that no treatment is needed for these benign conditions at this time. The patient should follow up should the lesion change or become symptomatic. 7.Actinic Keratoses. The diagnosis of this precancerous lesion related to sun exposure was reviewed. Verbal consent was obtained. I treated 1 lesions with cryotherapy and post-cryotherapy care was reviewed. 8.Neoplasm of Uncertain Behavior, right shoulder. The differential diagnoses were discussed. Biopsy followed with curettage was advised to address this lesion with malignant destruction. The procedure was reviewed and verbal and written consent were obtained. The risks of pain, bleeding, infection, scar, and recurrence of the lesion were discussed. I performed the procedure. The site was cleansed and anesthetized with 1% Lidocaine with Epinephrine 1:100,000. Curettage was performed by me to include a 2 mm margin, resulting in a post curettage defect size of 12 x 9 mm. Drysol was used for hemostasis. The wound was bandaged and care reviewed. 9. Neoplasm of Uncertain Behavior, right upper back. The differential diagnoses were discussed. Curettage was advised to address this lesion with malignant destruction. The procedure was reviewed and verbal and written consent were obtained. The risks of pain, bleeding, infection, scar, and recurrence of the lesion were discussed. I performed the procedure. The site was cleansed and anesthetized with 1% Lidocaine with Epinephrine 1:100,000. Curettage was performed by me to include a 2 mm margin, resulting in a post curettage defect size of 8 x 8 mm. Drysol was used for hemostasis. The wound was bandaged and care reviewed. 10. Neoplasm of Uncertain Behavior, left lower back. The differential diagnoses were discussed.  Curettage was advised to address this lesion with malignant destruction. The procedure was reviewed and verbal and written consent were obtained. The risks of pain, bleeding, infection, scar, and recurrence of the lesion were discussed. I performed the procedure. The site was cleansed and anesthetized with 1% Lidocaine with Epinephrine 1:100,000. Curettage was performed by me to include a 2 mm margin, resulting in a post curettage defect size of 10 x 8 mm. Drysol was used for hemostasis. The wound was bandaged and care reviewed. 11. Neck swelling. This was discussed and he will follow up with PCP. [facility]   OFFICE PROCEDURE PROGRESS NOTE   Chart reviewed for the following:   I, [place] Charmayne Ng, have reviewed the History, Physical and updated the Allergic reactions for Pecolia Brody. TIME OUT performed immediately prior to start of procedure:   [place], [id number], have performed the following reviews on Pecolia Brody   prior to the start of the procedure:     * Patient was identified by name and date of birth   * Agreement on procedure being performed was verified   * Risks and Benefits explained to the patient   * Procedure site verified and marked as necessary   * Patient was positioned for comfort   * Consent was signed and verified     Time: 1348  Date of procedure: 5/20/2020  Procedure performed by: Joseph Webster.  Charmayne Ng  Provider assisted by: lpn   Patient assisted by: self   How tolerated by patient: tolerated the procedure well with no complications   Comments: none                            Next skin exam:  6 months

## 2020-05-20 ENCOUNTER — OFFICE VISIT (OUTPATIENT)
Dept: DERMATOLOGY | Facility: AMBULATORY SURGERY CENTER | Age: 75
End: 2020-05-20

## 2020-05-20 ENCOUNTER — HOSPITAL ENCOUNTER (OUTPATIENT)
Dept: LAB | Age: 75
Discharge: HOME | End: 2020-05-20

## 2020-05-20 VITALS — WEIGHT: 190 LBS | OXYGEN SATURATION: 97 % | BODY MASS INDEX: 27.2 KG/M2 | HEART RATE: 57 BPM | HEIGHT: 70 IN

## 2020-05-20 DIAGNOSIS — Z85.828 HISTORY OF NONMELANOMA SKIN CANCER: ICD-10-CM

## 2020-05-20 DIAGNOSIS — D22.9 MULTIPLE BENIGN NEVI: ICD-10-CM

## 2020-05-20 DIAGNOSIS — Z12.83 SCREENING FOR MALIGNANT NEOPLASM OF SKIN: ICD-10-CM

## 2020-05-20 DIAGNOSIS — D23.9 DERMATOFIBROMA: ICD-10-CM

## 2020-05-20 DIAGNOSIS — L81.4 LENTIGINES: ICD-10-CM

## 2020-05-20 DIAGNOSIS — D18.01 CHERRY ANGIOMA: ICD-10-CM

## 2020-05-20 DIAGNOSIS — L82.1 SEBORRHEIC KERATOSES: ICD-10-CM

## 2020-05-20 DIAGNOSIS — D48.5 NEOPLASM OF UNCERTAIN BEHAVIOR OF SKIN OF BACK: ICD-10-CM

## 2020-05-20 DIAGNOSIS — D48.5 NEOPLASM OF UNCERTAIN BEHAVIOR OF SKIN OF SHOULDER: Primary | ICD-10-CM

## 2020-05-20 DIAGNOSIS — L57.0 ACTINIC KERATOSIS: ICD-10-CM

## 2020-05-20 RX ORDER — DICLOFENAC SODIUM 75 MG/1
TABLET, DELAYED RELEASE ORAL
COMMUNITY
Start: 2020-04-06 | End: 2021-06-03

## 2020-05-20 NOTE — PROGRESS NOTES
Room: 7    Identified pt with two pt identifiers(name and ). Reviewed record in preparation for visit and have obtained necessary documentation. All patient medications has been reviewed. No chief complaint on file. Health Maintenance Due   Topic    Shingrix Vaccine Age 50> (1 of 2)    GLAUCOMA SCREENING Q2Y     Medicare Yearly Exam        There were no vitals filed for this visit. 1.Have you traveled outside of the [address] [place] [address] in the last month No    2. Have you been in close contact with someone who is suspected to have COVID-19 or has tested positive. No    3. Do you have any signs or symptoms. ? 4. Have you been to the ER, urgent care clinic since your last visit? Hospitalized since your last visit? No    5. Have you seen or consulted any other health care providers outside of the [address] [place] [address] since your last visit? Include any pap smears or colon screening. No          Patient is accompanied by self I have received verbal consent from Daniel Reyes to discuss any/all medical information while they are present in the room.

## 2020-06-28 DIAGNOSIS — M10.9 GOUT, UNSPECIFIED CAUSE, UNSPECIFIED CHRONICITY, UNSPECIFIED SITE: ICD-10-CM

## 2020-06-29 RX ORDER — ALLOPURINOL 100 MG/1
TABLET ORAL
Qty: 90 TAB | Refills: 3 | Status: SHIPPED | OUTPATIENT
Start: 2020-06-29 | End: 2021-06-03

## 2020-08-06 RX ORDER — LISINOPRIL 20 MG/1
TABLET ORAL
Qty: 90 TAB | Refills: 3 | Status: SHIPPED | OUTPATIENT
Start: 2020-08-06 | End: 2021-06-03

## 2021-02-24 ENCOUNTER — OFFICE VISIT (OUTPATIENT)
Dept: FAMILY MEDICINE CLINIC | Age: 76
End: 2021-02-24
Payer: MEDICARE

## 2021-02-24 VITALS
WEIGHT: 187 LBS | RESPIRATION RATE: 18 BRPM | SYSTOLIC BLOOD PRESSURE: 133 MMHG | BODY MASS INDEX: 26.77 KG/M2 | DIASTOLIC BLOOD PRESSURE: 76 MMHG | HEIGHT: 70 IN | HEART RATE: 60 BPM | TEMPERATURE: 97.5 F | OXYGEN SATURATION: 100 %

## 2021-02-24 DIAGNOSIS — M10.9 GOUT, UNSPECIFIED CAUSE, UNSPECIFIED CHRONICITY, UNSPECIFIED SITE: ICD-10-CM

## 2021-02-24 DIAGNOSIS — I10 ESSENTIAL HYPERTENSION, BENIGN: Primary | ICD-10-CM

## 2021-02-24 DIAGNOSIS — E78.00 ELEVATED LDL CHOLESTEROL LEVEL: ICD-10-CM

## 2021-02-24 DIAGNOSIS — Z13.31 SCREENING FOR DEPRESSION: ICD-10-CM

## 2021-02-24 DIAGNOSIS — Z00.00 MEDICARE ANNUAL WELLNESS VISIT, SUBSEQUENT: ICD-10-CM

## 2021-02-24 DIAGNOSIS — Z12.5 SPECIAL SCREENING FOR MALIGNANT NEOPLASM OF PROSTATE: ICD-10-CM

## 2021-02-24 LAB
ALBUMIN SERPL-MCNC: 4.1 G/DL (ref 3.5–5)
ALBUMIN/GLOB SERPL: 1.2 {RATIO} (ref 1.1–2.2)
ALP SERPL-CCNC: 80 U/L (ref 45–117)
ALT SERPL-CCNC: 33 U/L (ref 12–78)
ANION GAP SERPL CALC-SCNC: 6 MMOL/L (ref 5–15)
AST SERPL-CCNC: 17 U/L (ref 15–37)
BILIRUB SERPL-MCNC: 0.6 MG/DL (ref 0.2–1)
BUN SERPL-MCNC: 36 MG/DL (ref 6–20)
BUN/CREAT SERPL: 24 (ref 12–20)
CALCIUM SERPL-MCNC: 9.6 MG/DL (ref 8.5–10.1)
CHLORIDE SERPL-SCNC: 104 MMOL/L (ref 97–108)
CHOLEST SERPL-MCNC: 238 MG/DL
CO2 SERPL-SCNC: 28 MMOL/L (ref 21–32)
CREAT SERPL-MCNC: 1.5 MG/DL (ref 0.7–1.3)
ERYTHROCYTE [DISTWIDTH] IN BLOOD BY AUTOMATED COUNT: 13.5 % (ref 11.5–14.5)
GLOBULIN SER CALC-MCNC: 3.5 G/DL (ref 2–4)
GLUCOSE SERPL-MCNC: 104 MG/DL (ref 65–100)
HCT VFR BLD AUTO: 43.9 % (ref 36.6–50.3)
HDLC SERPL-MCNC: 57 MG/DL
HDLC SERPL: 4.2 {RATIO} (ref 0–5)
HGB BLD-MCNC: 14.7 G/DL (ref 12.1–17)
LDLC SERPL CALC-MCNC: 165.6 MG/DL (ref 0–100)
LIPID PROFILE,FLP: ABNORMAL
MCH RBC QN AUTO: 32.1 PG (ref 26–34)
MCHC RBC AUTO-ENTMCNC: 33.5 G/DL (ref 30–36.5)
MCV RBC AUTO: 95.9 FL (ref 80–99)
NRBC # BLD: 0 K/UL (ref 0–0.01)
NRBC BLD-RTO: 0 PER 100 WBC
PLATELET # BLD AUTO: 235 K/UL (ref 150–400)
PMV BLD AUTO: 9.7 FL (ref 8.9–12.9)
POTASSIUM SERPL-SCNC: 5.2 MMOL/L (ref 3.5–5.1)
PROT SERPL-MCNC: 7.6 G/DL (ref 6.4–8.2)
PSA SERPL-MCNC: 3.6 NG/ML (ref 0.01–4)
RBC # BLD AUTO: 4.58 M/UL (ref 4.1–5.7)
SODIUM SERPL-SCNC: 138 MMOL/L (ref 136–145)
TRIGL SERPL-MCNC: 77 MG/DL (ref ?–150)
URATE SERPL-MCNC: 6.3 MG/DL (ref 3.5–7.2)
VLDLC SERPL CALC-MCNC: 15.4 MG/DL
WBC # BLD AUTO: 8.4 K/UL (ref 4.1–11.1)

## 2021-02-24 PROCEDURE — G8752 SYS BP LESS 140: HCPCS | Performed by: FAMILY MEDICINE

## 2021-02-24 PROCEDURE — G0439 PPPS, SUBSEQ VISIT: HCPCS | Performed by: FAMILY MEDICINE

## 2021-02-24 PROCEDURE — G8754 DIAS BP LESS 90: HCPCS | Performed by: FAMILY MEDICINE

## 2021-02-24 PROCEDURE — G8419 CALC BMI OUT NRM PARAM NOF/U: HCPCS | Performed by: FAMILY MEDICINE

## 2021-02-24 PROCEDURE — G8510 SCR DEP NEG, NO PLAN REQD: HCPCS | Performed by: FAMILY MEDICINE

## 2021-02-24 PROCEDURE — G8427 DOCREV CUR MEDS BY ELIG CLIN: HCPCS | Performed by: FAMILY MEDICINE

## 2021-02-24 PROCEDURE — 99214 OFFICE O/P EST MOD 30 MIN: CPT | Performed by: FAMILY MEDICINE

## 2021-02-24 PROCEDURE — 1101F PT FALLS ASSESS-DOCD LE1/YR: CPT | Performed by: FAMILY MEDICINE

## 2021-02-24 PROCEDURE — G8536 NO DOC ELDER MAL SCRN: HCPCS | Performed by: FAMILY MEDICINE

## 2021-02-24 NOTE — PATIENT INSTRUCTIONS
Medicare Wellness Visit, Male 
 
The best way to live healthy is to have a lifestyle where you eat a well-balanced diet, exercise regularly, limit alcohol use, and quit all forms of tobacco/nicotine, if applicable.  
 
Regular preventive services are another way to keep healthy. Preventive services (vaccines, screening tests, monitoring & exams) can help personalize your care plan, which helps you manage your own care. Screening tests can find health problems at the earliest stages, when they are easiest to treat.  
[facility] follows the current, evidence-based guidelines published by the [place] Preventive Services Task Force (USPSTF) when recommending preventive services for our patients. Because we follow these guidelines, sometimes recommendations change over time as research supports it. (For example, a prostate screening blood test is no longer routinely recommended for men with no symptoms).  Of course, you and your doctor may decide to screen more often for some diseases, based on your risk and co-morbidities (chronic disease you are already diagnosed with).  
 
Preventive services for you include: 
- Medicare offers their members a free annual wellness visit, which is time for you and your primary care provider to discuss and plan for your preventive service needs. Take advantage of this benefit every year! 
-All adults over age 65 should receive the recommended pneumonia vaccines. Current USPSTF guidelines recommend a series of two vaccines for the best pneumonia protection.  
-All adults should have a flu vaccine yearly and tetanus vaccine every 10 years. 
-All adults age 50 and older should receive the shingles vaccines (series of two vaccines).      
-All adults age 40-70 who are overweight should have a diabetes screening test once every three years.  
 -Other screening tests & preventive services for persons with diabetes include: an eye exam to screen for diabetic retinopathy, a kidney function test, a foot exam, and stricter control over your cholesterol.  
-Cardiovascular screening for adults with routine risk involves an electrocardiogram (ECG) at intervals determined by the provider.  
-Colorectal cancer screening should be done for adults age 54-65 with no increased risk factors for colorectal cancer. There are a number of acceptable methods of screening for this type of cancer. Each test has its own benefits and drawbacks. Discuss with your provider what is most appropriate for you during your annual wellness visit. The different tests include: colonoscopy (considered the best screening method), a fecal occult blood test, a fecal DNA test, and sigmoidoscopy. 
-All adults born between [facility] should be screened once for Hepatitis C. 
-An Abdominal Aortic Aneurysm (AAA) Screening is recommended for men age 73-68 who has ever smoked in their lifetime. Here is a list of your current Health Maintenance items (your personalized list of preventive services) with a due date: 
Health Maintenance Due Topic Date Due  
 Hepatitis C Test  1945  COVID-19 Vaccine (1 of 2) 05/04/1960  Shingles Vaccine (1 of 2) 05/04/1994  Glaucoma Screening   09/14/2019  Yearly Flu Vaccine (1) 09/01/2020

## 2021-02-24 NOTE — PROGRESS NOTES
Progress Note    he is a 67y.o. year old male who presents for evalution. Subjective:     Patient here for his annual Medicare wellness visit and follow-up. LDL cholesterol has been elevated 136, he is currently not on any statin therapy. Patient does try to follow a healthy diet. Weight is 187 BMI is 26.83. Blood pressure is well controlled on lisinopril. History of gout on allopurinol for prevention we will check a uric acid as well. Patient would like to have his PSA checked previous was 2.0    Reviewed PmHx, RxHx, FmHx, SocHx, AllgHx and updated and dated in the chart. Review of Systems - negative except as listed above in the HPI    Objective:     Vitals:    02/24/21 0748   BP: 133/76   Pulse: 60   Resp: 18   Temp: 97.5 °F (36.4 °C)   TempSrc: Oral   SpO2: 100%   Weight: 187 lb (84.8 kg)   Height: 5' 10\" (1.778 m)       Current Outpatient Medications   Medication Sig    lisinopriL (PRINIVIL, ZESTRIL) 20 mg tablet TAKE 1 TABLET EVERY DAY    allopurinoL (ZYLOPRIM) 100 mg tablet TAKE 1 TABLET EVERY DAY    diclofenac EC (VOLTAREN) 75 mg EC tablet     folic acid (FOLVITE) 1 mg tablet Take 1 mg by mouth daily.  glucosamine/chondr bonner A sod (GLUCOSAMINE-CHONDROITIN) 750-600 mg tab Take 1 Tab by mouth daily.  sildenafil citrate (VIAGRA) 50 mg tablet Take 1 Tab by mouth as needed (ED). Indications: Inability to have an Erection     No current facility-administered medications for this visit.         Physical Examination: General appearance - alert, well appearing, and in no distress  Mental status - alert, oriented to person, place, and time  Eyes - pupils equal and reactive, extraocular eye movements intact  Ears - bilateral TM's and external ear canals normal  Neck - supple, no significant adenopathy  Lymphatics - no palpable lymphadenopathy, no hepatosplenomegaly  Chest - clear to auscultation, no wheezes, rales or rhonchi, symmetric air entry  Heart - normal rate, regular rhythm, normal S1, S2, no murmurs, rubs, clicks or gallops  Abdomen - soft, nontender, nondistended, no masses or organomegaly  Neurological - alert, oriented, normal speech, no focal findings or movement disorder noted  Musculoskeletal - no joint tenderness, deformity or swelling  Extremities - peripheral pulses normal, no pedal edema, no clubbing or cyanosis      Assessment/ Plan:   Diagnoses and all orders for this visit:    1. Essential hypertension, benign  -     METABOLIC PANEL, COMPREHENSIVE; Future  -     CBC W/O DIFF; Future    2. Medicare annual wellness visit, subsequent  -     CBC W/O DIFF; Future    3. Screening for depression  -     DEPRESSION SCREEN ANNUAL    4. Special screening for malignant neoplasm of prostate  -     PSA SCREENING (SCREENING); Future    5. Gout, unspecified cause, unspecified chronicity, unspecified site  -     CBC W/O DIFF; Future  -     URIC ACID; Future    6. Elevated LDL cholesterol level  -     LIPID PANEL; Future  -     METABOLIC PANEL, COMPREHENSIVE; Future  -     CBC W/O DIFF; Future       Follow-up and Dispositions    · Return in about 1 year (around 2/24/2022), or if symptoms worsen or fail to improve. I have discussed the diagnosis with the patient and the intended plan as seen in the above orders. The patient has received an after-visit summary and questions were answered concerning future plans. Pt conveyed understanding of plan. Medication Side Effects and Warnings were discussed with patient    An electronic signature was used to authenticate this note  Girtha Pack, DO    This is the Subsequent Medicare Annual Wellness Exam, performed 12 months or more after the Initial AWV or the last Subsequent AWV    I have reviewed the patient's medical history in detail and updated the computerized patient record.      Depression Risk Factor Screening:     3 most recent PHQ Screens 2/24/2021   Little interest or pleasure in doing things Not at all   Feeling down, depressed, irritable, or hopeless Not at all   Total Score PHQ 2 0       Alcohol Risk Screen    Do you average more than 1 drink per night or more than 7 drinks a week: No    In the past three months have you have had more than 4 drinks containing alcohol on one occasion: No        Functional Ability and Level of Safety:    Hearing: poor hearing from L ear      Activities of Daily Living: The home contains: no safety equipment. Patient does total self care      Ambulation: with no difficulty     Fall Risk:  Fall Risk Assessment, last 12 mths 5/20/2020   Able to walk? Yes   Fall in past 12 months? No      Abuse Screen:  Patient is not abused       Cognitive Screening    Has your family/caregiver stated any concerns about your memory: no         Assessment/Plan   Education and counseling provided:  Are appropriate based on today's review and evaluation    Diagnoses and all orders for this visit:    1. Essential hypertension, benign  -     METABOLIC PANEL, COMPREHENSIVE; Future  -     CBC W/O DIFF; Future    2. Medicare annual wellness visit, subsequent  -     CBC W/O DIFF; Future    3. Screening for depression  -     DEPRESSION SCREEN ANNUAL    4. Special screening for malignant neoplasm of prostate  -     PSA SCREENING (SCREENING); Future    5. Gout, unspecified cause, unspecified chronicity, unspecified site  -     CBC W/O DIFF; Future  -     URIC ACID; Future    6. Elevated LDL cholesterol level  -     LIPID PANEL; Future  -     METABOLIC PANEL, COMPREHENSIVE; Future  -     CBC W/O DIFF;  Future        Health Maintenance Due     Health Maintenance Due   Topic Date Due    Hepatitis C Screening  1945    COVID-19 Vaccine (1 of 2) 05/04/1960    Shingrix Vaccine Age 50> (1 of 2) 05/04/1994    GLAUCOMA SCREENING Q2Y  09/14/2019    Flu Vaccine (1) 09/01/2020       Patient Care Team   Patient Care Team:  Arleen Patel MD as PCP - General (Family Medicine)  Arleen Patel MD as PCP - [facility] Empaneled Provider    History Patient Active Problem List   Diagnosis Code    Gout M10.9    Essential hypertension, benign I5    Male erectile dysfunction N52.9    Advance care planning Z71.89     Past Medical History:   Diagnosis Date    Arthritis     gout    Family history of skin cancer     Gout 11/13/2013    Hypertension     Radiation exposure     Skin cancer     Sun-damaged skin       Past Surgical History:   Procedure Laterality Date    COLONOSCOPY  11/20/2014         HX COLONOSCOPY  2006 2015 1 polyp,     HX HEENT      oral surgery    HX MOHS PROCEDURES  05/30/2018    Recurrent BCC L chest by Dr. Rosi Colin      Current Outpatient Medications   Medication Sig Dispense Refill    lisinopriL (PRINIVIL, ZESTRIL) 20 mg tablet TAKE 1 TABLET EVERY DAY 90 Tab 3    allopurinoL (ZYLOPRIM) 100 mg tablet TAKE 1 TABLET EVERY DAY 90 Tab 3    diclofenac EC (VOLTAREN) 75 mg EC tablet       folic acid (FOLVITE) 1 mg tablet Take 1 mg by mouth daily.  glucosamine/chondr bonner A sod (GLUCOSAMINE-CHONDROITIN) 750-600 mg tab Take 1 Tab by mouth daily.  sildenafil citrate (VIAGRA) 50 mg tablet Take 1 Tab by mouth as needed (ED). Indications: Inability to have an Erection 5 Tab 0     No Known Allergies    Family History   Problem Relation Age of Onset    Psychiatric Disorder Mother     Lung Disease Father      Social History     Tobacco Use    Smoking status: Never Smoker    Smokeless tobacco: Never Used   Substance Use Topics    Alcohol use: Yes     Alcohol/week: 1.7 standard drinks     Types: 1 Glasses of wine, 1 Cans of beer per week     Comment: 2-3 times a week   I have discussed the diagnosis with the patient and the intended plan as seen in the above orders. The patient has received an after-visit summary and questions were answered concerning future plans. Pt conveyed understanding of plan.     An electronic signature was used to authenticate this note  Dr Zain Champion

## 2021-02-24 NOTE — PROGRESS NOTES
Chief Complaint   Patient presents with    Annual Wellness Visit    Labs     Patient presents in office today for AMW visit and fasting labs. No concerns. 1. Have you been to the ER, urgent care clinic since your last visit? Hospitalized since your last visit? No    2. Have you seen or consulted any other health care providers outside of the [address] [place] [address] since your last visit? Include any pap smears or colon screening.  No    Learning Assessment 8/9/2018   PRIMARY LEARNER Patient   HIGHEST LEVEL OF EDUCATION - PRIMARY LEARNER  > 4 YEARS OF COLLEGE   BARRIERS PRIMARY LEARNER NONE   CO-LEARNER CAREGIVER -   PRIMARY LANGUAGE ENGLISH   LEARNER PREFERENCE PRIMARY DEMONSTRATION   ANSWERED BY pt   RELATIONSHIP SELF

## 2021-02-25 NOTE — PROGRESS NOTES
Your kidney function is off. Please improve your hydration and schedule a follow-up appointment for 2 to 4 weeks. This needs to be rechecked. Your cholesterol has jumped up quite a bit with a bad cholesterol going from 136 up to 165.6. Your total cholesterol from 195 now 238. Recommend a lower cholesterol diet and increased exercise as tolerated. Plan to recheck this in 6 months. Your fasting glucose is a little bit elevated as well. Please decrease the carbohydrates/sugars. Uric acid level is normal and your prostate cancer marker is normal as well.

## 2021-03-31 ENCOUNTER — OFFICE VISIT (OUTPATIENT)
Dept: FAMILY MEDICINE CLINIC | Age: 76
End: 2021-03-31
Payer: MEDICARE

## 2021-03-31 VITALS
HEIGHT: 70 IN | TEMPERATURE: 97.4 F | HEART RATE: 50 BPM | BODY MASS INDEX: 26.63 KG/M2 | SYSTOLIC BLOOD PRESSURE: 157 MMHG | WEIGHT: 186 LBS | OXYGEN SATURATION: 99 % | DIASTOLIC BLOOD PRESSURE: 89 MMHG | RESPIRATION RATE: 18 BRPM

## 2021-03-31 DIAGNOSIS — R79.89 ELEVATED SERUM CREATININE: Primary | ICD-10-CM

## 2021-03-31 LAB
ALBUMIN SERPL-MCNC: 4.1 G/DL (ref 3.5–5)
ANION GAP SERPL CALC-SCNC: 4 MMOL/L (ref 5–15)
BUN SERPL-MCNC: 34 MG/DL (ref 6–20)
BUN/CREAT SERPL: 27 (ref 12–20)
CALCIUM SERPL-MCNC: 8.9 MG/DL (ref 8.5–10.1)
CHLORIDE SERPL-SCNC: 106 MMOL/L (ref 97–108)
CO2 SERPL-SCNC: 29 MMOL/L (ref 21–32)
CREAT SERPL-MCNC: 1.24 MG/DL (ref 0.7–1.3)
GLUCOSE SERPL-MCNC: 88 MG/DL (ref 65–100)
PHOSPHATE SERPL-MCNC: 3 MG/DL (ref 2.6–4.7)
POTASSIUM SERPL-SCNC: 4.8 MMOL/L (ref 3.5–5.1)
SODIUM SERPL-SCNC: 139 MMOL/L (ref 136–145)

## 2021-03-31 PROCEDURE — G8536 NO DOC ELDER MAL SCRN: HCPCS | Performed by: FAMILY MEDICINE

## 2021-03-31 PROCEDURE — G8427 DOCREV CUR MEDS BY ELIG CLIN: HCPCS | Performed by: FAMILY MEDICINE

## 2021-03-31 PROCEDURE — G8510 SCR DEP NEG, NO PLAN REQD: HCPCS | Performed by: FAMILY MEDICINE

## 2021-03-31 PROCEDURE — G8754 DIAS BP LESS 90: HCPCS | Performed by: FAMILY MEDICINE

## 2021-03-31 PROCEDURE — G8753 SYS BP > OR = 140: HCPCS | Performed by: FAMILY MEDICINE

## 2021-03-31 PROCEDURE — G8419 CALC BMI OUT NRM PARAM NOF/U: HCPCS | Performed by: FAMILY MEDICINE

## 2021-03-31 PROCEDURE — 1101F PT FALLS ASSESS-DOCD LE1/YR: CPT | Performed by: FAMILY MEDICINE

## 2021-03-31 PROCEDURE — 99213 OFFICE O/P EST LOW 20 MIN: CPT | Performed by: FAMILY MEDICINE

## 2021-03-31 NOTE — PROGRESS NOTES
Chief Complaint   Patient presents with   [address]     Patient presents in office today for f/u and labs. No concerns. 1. Have you been to the ER, urgent care clinic since your last visit? Hospitalized since your last visit? No    2. Have you seen or consulted any other health care providers outside of the [address] [place] [address] since your last visit? Include any pap smears or colon screening.  No    Learning Assessment 8/9/2018   PRIMARY LEARNER Patient   HIGHEST LEVEL OF EDUCATION - PRIMARY LEARNER  > 4 YEARS OF COLLEGE   BARRIERS PRIMARY LEARNER NONE   CO-LEARNER CAREGIVER -   PRIMARY LANGUAGE ENGLISH   LEARNER PREFERENCE PRIMARY DEMONSTRATION   ANSWERED BY pt   RELATIONSHIP SELF

## 2021-03-31 NOTE — PATIENT INSTRUCTIONS
A Healthy Lifestyle: Care Instructions Your Care Instructions A healthy lifestyle can help you feel good, stay at a healthy weight, and have plenty of energy for both work and play. A healthy lifestyle is something you can share with your whole family. A healthy lifestyle also can lower your risk for serious health problems, such as high blood pressure, heart disease, and diabetes. You can follow a few steps listed below to improve your health and the health of your family. Follow-up care is a key part of your treatment and safety. Be sure to make and go to all appointments, and call your doctor if you are having problems. It's also a good idea to know your test results and keep a list of the medicines you take. How can you care for yourself at home? · Do not eat too much sugar, fat, or fast foods. You can still have dessert and treats now and then. The goal is moderation. · Start small to improve your eating habits. Pay attention to portion sizes, drink less juice and soda pop, and eat more fruits and vegetables. ? Eat a healthy amount of food. A 3-ounce serving of meat, for example, is about the size of a deck of cards. Fill the rest of your plate with vegetables and whole grains. ? Limit the amount of soda and sports drinks you have every day. Drink more water when you are thirsty. ? Eat at least 5 servings of fruits and vegetables every day. It may seem like a lot, but it is not hard to reach this goal. A serving or helping is 1 piece of fruit, 1 cup of vegetables, or 2 cups of leafy, raw vegetables. Have an apple or some carrot sticks as an afternoon snack instead of a candy bar. Try to have fruits and/or vegetables at every meal. 
· Make exercise part of your daily routine. You may want to start with simple activities, such as walking, bicycling, or slow swimming. Try to be active 30 to 60 minutes every day. You do not need to do all 30 to 60 minutes all at once.  For example, you can exercise 3 times a day for 10 or 20 minutes. Moderate exercise is safe for most people, but it is always a good idea to talk to your doctor before starting an exercise program. 
· Keep moving. Pelon Mings the lawn, work in the garden, or [employer]. Take the stairs instead of the elevator at work. · If you smoke, quit. People who smoke have an increased risk for heart attack, stroke, cancer, and other lung illnesses. Quitting is hard, but there are ways to boost your chance of quitting tobacco for good. ? Use nicotine gum, patches, or lozenges. ? Ask your doctor about stop-smoking programs and medicines. ? Keep trying. In addition to reducing your risk of diseases in the future, you will notice some benefits soon after you stop using tobacco. If you have shortness of breath or asthma symptoms, they will likely get better within a few weeks after you quit. · Limit how much alcohol you drink. Moderate amounts of alcohol (up to 2 drinks a day for men, 1 drink a day for women) are okay. But drinking too much can lead to liver problems, high blood pressure, and other health problems. Family health If you have a family, there are many things you can do together to improve your health. · Eat meals together as a family as often as possible. · Eat healthy foods. This includes fruits, vegetables, lean meats and dairy, and whole grains. · Include your family in your fitness plan. Most people think of activities such as jogging or tennis as the way to fitness, but there are many ways you and your family can be more active. Anything that makes you breathe hard and gets your heart pumping is exercise. Here are some tips: 
? Walk to do errands or to take your child to school or the bus. 
? Go for a family bike ride after dinner instead of watching TV. Where can you learn more? Go to http://www.gray.com/ Enter I273 in the search box to learn more about \"A Healthy Lifestyle: Care Instructions. \" Current as of: January 31, 2020               Content Version: 12.6 © 7826-3568 [employer]. Care instructions adapted under license by [employer] (which disclaims liability or warranty for this information). If you have questions about a medical condition or this instruction, always ask your healthcare professional. [address] any warranty or liability for your use of this information.

## 2021-03-31 NOTE — PROGRESS NOTES
Progress Note    he is a 67y.o. year old male who presents for evalution. Subjective:     Pt here for follow up and his Cr had been elevated at 1.5 and prev ws 1.2. Pt states he has been well hydrated. In 2018 was 1.47. Recheck today. Reviewed PmHx, RxHx, FmHx, SocHx, AllgHx and updated and dated in the chart. Review of Systems - negative except as listed above in the HPI    Objective:     Vitals:    03/31/21 0745   BP: (!) 157/89   Pulse: (!) 50   Resp: 18   Temp: 97.4 °F (36.3 °C)   TempSrc: Temporal   SpO2: 99%   Weight: 186 lb (84.4 kg)   Height: 5' 10\" (1.778 m)       Current Outpatient Medications   Medication Sig    lisinopriL (PRINIVIL, ZESTRIL) 20 mg tablet TAKE 1 TABLET EVERY DAY    allopurinoL (ZYLOPRIM) 100 mg tablet TAKE 1 TABLET EVERY DAY    diclofenac EC (VOLTAREN) 75 mg EC tablet     sildenafil citrate (VIAGRA) 50 mg tablet Take 1 Tab by mouth as needed (ED). Indications: Inability to have an Erection    folic acid (FOLVITE) 1 mg tablet Take 1 mg by mouth daily.  glucosamine/chondr bonner A sod (GLUCOSAMINE-CHONDROITIN) 750-600 mg tab Take 1 Tab by mouth daily. No current facility-administered medications for this visit. Physical Examination: General appearance - alert, well appearing, and in no distress  Chest - clear to auscultation, no wheezes, rales or rhonchi, symmetric air entry  Heart - normal rate, regular rhythm, normal S1, S2, no murmurs, rubs, clicks or gallops      Assessment/ Plan:   Diagnoses and all orders for this visit:    1. Elevated serum creatinine  -     RENAL FUNCTION PANEL; Future     prior 1.5, 1.2, 1.47  Follow-up and Dispositions    · Return if symptoms worsen or fail to improve. I have discussed the diagnosis with the patient and the intended plan as seen in the above orders. The patient has received an after-visit summary and questions were answered concerning future plans. Pt conveyed understanding of plan.     Medication Side Effects and Warnings were discussed with patient    An electronic signature was used to authenticate this note  Arturo Austin DO

## 2021-06-02 DIAGNOSIS — M10.9 GOUT, UNSPECIFIED CAUSE, UNSPECIFIED CHRONICITY, UNSPECIFIED SITE: ICD-10-CM

## 2021-06-03 RX ORDER — ALLOPURINOL 100 MG/1
TABLET ORAL
Qty: 90 TABLET | Refills: 3 | Status: SHIPPED | OUTPATIENT
Start: 2021-06-03 | End: 2022-07-11

## 2021-06-03 RX ORDER — LORATADINE 10 MG/1
TABLET ORAL
Qty: 90 TABLET | Refills: 1 | Status: SHIPPED | OUTPATIENT
Start: 2021-06-03 | End: 2021-11-26

## 2021-06-03 RX ORDER — DICLOFENAC SODIUM 75 MG/1
TABLET, DELAYED RELEASE ORAL
Qty: 180 TABLET | Refills: 1 | Status: SHIPPED | OUTPATIENT
Start: 2021-06-03 | End: 2021-11-26

## 2021-06-03 RX ORDER — LISINOPRIL 20 MG/1
TABLET ORAL
Qty: 90 TABLET | Refills: 3 | Status: SHIPPED | OUTPATIENT
Start: 2021-06-03 | End: 2022-05-09

## 2021-10-19 ENCOUNTER — OFFICE VISIT (OUTPATIENT)
Dept: FAMILY MEDICINE CLINIC | Age: 76
End: 2021-10-19
Payer: MEDICARE

## 2021-10-19 VITALS
WEIGHT: 185 LBS | BODY MASS INDEX: 26.48 KG/M2 | HEART RATE: 63 BPM | TEMPERATURE: 97.6 F | OXYGEN SATURATION: 100 % | RESPIRATION RATE: 18 BRPM | SYSTOLIC BLOOD PRESSURE: 157 MMHG | HEIGHT: 70 IN | DIASTOLIC BLOOD PRESSURE: 79 MMHG

## 2021-10-19 DIAGNOSIS — Z23 ENCOUNTER FOR IMMUNIZATION: Primary | ICD-10-CM

## 2021-10-19 PROCEDURE — G0008 ADMIN INFLUENZA VIRUS VAC: HCPCS | Performed by: NURSE PRACTITIONER

## 2021-10-19 PROCEDURE — 90694 VACC AIIV4 NO PRSRV 0.5ML IM: CPT | Performed by: NURSE PRACTITIONER

## 2021-10-19 NOTE — PROGRESS NOTES
[place] is a 67 y.o. male    Chief Complaint   Patient presents with    Immunization/Injection     flu       Visit Vitals  BP (!) 157/79   Pulse 63   Temp 97.6 °F (36.4 °C) (Oral)   Resp 18   Ht 5' 10\" (1.778 m)   Wt 185 lb (83.9 kg)   SpO2 100%   BMI 26.54 kg/m²       3 most recent PHQ Screens 10/19/2021   Little interest or pleasure in doing things Not at all   Feeling down, depressed, irritable, or hopeless Not at all   Total Score PHQ 2 0       Fall Risk Assessment, last 12 mths 10/19/2021   Able to walk? Yes   Fall in past 12 months? 0   Do you feel unsteady? -   Are you worried about falling -       Abuse Screening Questionnaire 12/17/2019   Do you ever feel afraid of your partner? N   Are you in a relationship with someone who physically or mentally threatens you? N   Is it safe for you to go home? Y       1. Have you been to the ER, urgent care clinic since your last visit? Hospitalized since your last visit? No     2. Have you seen or consulted any other health care providers outside of the [address] [place] [address] since your last visit? Include any pap smears or colon screening.  No

## 2021-10-24 NOTE — PROGRESS NOTES
Here for immunization only. No contraindications to vaccination noted. Orders Placed This Encounter    INFLUENZA VIRUS VACCINE, HIGH DOSE SEASONAL, PRESERVATIVE FREE       See nursing documentation for administration information.     Dwight Duane, NP  10/24/21

## 2021-11-26 RX ORDER — DICLOFENAC SODIUM 75 MG/1
TABLET, DELAYED RELEASE ORAL
Qty: 180 TABLET | Refills: 1 | Status: SHIPPED | OUTPATIENT
Start: 2021-11-26

## 2021-11-26 RX ORDER — LORATADINE 10 MG/1
TABLET ORAL
Qty: 90 TABLET | Refills: 1 | Status: SHIPPED | OUTPATIENT
Start: 2021-11-26

## 2022-02-09 ENCOUNTER — OFFICE VISIT (OUTPATIENT)
Dept: FAMILY MEDICINE CLINIC | Age: 77
End: 2022-02-09
Payer: MEDICARE

## 2022-02-09 VITALS
DIASTOLIC BLOOD PRESSURE: 78 MMHG | RESPIRATION RATE: 14 BRPM | HEART RATE: 74 BPM | OXYGEN SATURATION: 98 % | WEIGHT: 190.9 LBS | TEMPERATURE: 97.9 F | BODY MASS INDEX: 27.33 KG/M2 | SYSTOLIC BLOOD PRESSURE: 144 MMHG | HEIGHT: 70 IN

## 2022-02-09 DIAGNOSIS — L08.9 INFECTED EPIDERMOID CYST: Primary | ICD-10-CM

## 2022-02-09 DIAGNOSIS — L72.0 INFECTED EPIDERMOID CYST: Primary | ICD-10-CM

## 2022-02-09 PROCEDURE — 99213 OFFICE O/P EST LOW 20 MIN: CPT | Performed by: NURSE PRACTITIONER

## 2022-02-09 PROCEDURE — 1101F PT FALLS ASSESS-DOCD LE1/YR: CPT | Performed by: NURSE PRACTITIONER

## 2022-02-09 PROCEDURE — G8427 DOCREV CUR MEDS BY ELIG CLIN: HCPCS | Performed by: NURSE PRACTITIONER

## 2022-02-09 PROCEDURE — G8754 DIAS BP LESS 90: HCPCS | Performed by: NURSE PRACTITIONER

## 2022-02-09 PROCEDURE — G8419 CALC BMI OUT NRM PARAM NOF/U: HCPCS | Performed by: NURSE PRACTITIONER

## 2022-02-09 PROCEDURE — G8753 SYS BP > OR = 140: HCPCS | Performed by: NURSE PRACTITIONER

## 2022-02-09 PROCEDURE — G8432 DEP SCR NOT DOC, RNG: HCPCS | Performed by: NURSE PRACTITIONER

## 2022-02-09 PROCEDURE — G8536 NO DOC ELDER MAL SCRN: HCPCS | Performed by: NURSE PRACTITIONER

## 2022-02-09 RX ORDER — AMOXICILLIN AND CLAVULANATE POTASSIUM 875; 125 MG/1; MG/1
1 TABLET, FILM COATED ORAL 2 TIMES DAILY
Qty: 20 TABLET | Refills: 0 | Status: SHIPPED | OUTPATIENT
Start: 2022-02-09 | End: 2022-02-19

## 2022-02-09 NOTE — PROGRESS NOTES
Hai Ramírez is a 67 y.o. male      Chief Complaint   Patient presents with    Mass     Lump on back of neck. Pt stated that it's been since long time but recently it got little bigger. 1. Have you been to the ER, urgent care clinic since your last visit? Hospitalized since your last visit? No    2. Have you seen or consulted any other health care providers outside of the [address] [place] [address] since your last visit? Include any pap smears or colon screening.  No

## 2022-02-10 NOTE — PROGRESS NOTES
Norm Vasquez (: 1945) is a 67 y.o. male, established patient, here for evaluation of the following chief complaint(s): Mass (Lump on back of neck. Pt stated that it's been since long time but recently it got little bigger. )       ASSESSMENT/PLAN:  Below is the assessment and plan developed based on review of pertinent history, physical exam, labs, studies, and medications. 1. Infected epidermoid cyst  Add rx  dwp should consider excision if this becomes a recurrent issue  -     amoxicillin-clavulanate (AUGMENTIN) 875-125 mg per tablet; Take 1 Tablet by mouth two (2) times a day for 10 days. , Normal, Disp-20 Tablet, R-0      Return in about 4 weeks (around 3/9/2022) for annual wellness visit, fasting labs. I have discussed the diagnosis with the patient and the intended plan as seen in the above orders. The patient has received an after-visit summary and questions were answered concerning future plans. Patient conveyed understanding of the plan at the time of the visit. SUBJECTIVE/OBJECTIVE:  Presents for evaluation of lump on the back of his head. Patient reports longstanding history of a small bump on the lower portion of his posterior scalp on the right side. Over the past week he noticed that it increased significantly in size, and over the weekend it became tender. He did manipulate the area trying to express some drainage. Unfortunately he ended up making the area more tender and painful without any drainage noted. He played golf today, reports this seemed to aggravate the symptoms with increased tenderness of the lump and pain radiating into the neck on the right side. No fever or chills. Has tried no medication for the symptoms.     He reports having a sebaceous cyst on his back several years ago that followed a similar symptom pattern, was excised by his dermatologist.    Past Medical History:   Diagnosis Date    Arthritis     gout    Family history of skin cancer     Gout 11/13/2013    Hypertension     Radiation exposure     Skin cancer     Sun-damaged skin      Past Surgical History:   Procedure Laterality Date    COLONOSCOPY  11/20/2014         HX COLONOSCOPY  2006 2015 1 polyp,     HX HEENT      oral surgery    HX MOHS PROCEDURES  05/30/2018    Recurrent BCC L chest by Dr. Basim Ackerman      Family History   Problem Relation Age of Onset    Psychiatric Disorder Mother     Lung Disease Father      Social History     Tobacco Use    Smoking status: Never Smoker    Smokeless tobacco: Never Used   Substance Use Topics    Alcohol use: Yes     Alcohol/week: 1.7 standard drinks     Types: 1 Glasses of wine, 1 Cans of beer per week     Comment: 2-3 times a week         Review of Systems   Constitutional: Negative for activity change, appetite change, chills, diaphoresis, fatigue, fever and unexpected weight change. HENT: Negative. Eyes: Negative. Respiratory: Negative. Cardiovascular: Negative. Endocrine: Negative. Genitourinary: Negative. Musculoskeletal: Positive for neck pain. Negative for neck stiffness. Skin: Negative. Allergic/Immunologic: Negative. Neurological: Negative. Hematological: Negative. Psychiatric/Behavioral: Negative.       Visit Vitals  BP (!) 144/78 (BP 1 Location: Left upper arm, BP Patient Position: Sitting, BP Cuff Size: Adult)   Pulse 74   Temp 97.9 °F (36.6 °C) (Oral)   Resp 14   Ht 5' 10\" (1.778 m)   Wt 190 lb 14.4 oz (86.6 kg)   SpO2 98%   BMI 27.39 kg/m²       Physical Examination: General appearance - alert, well appearing, and in no distress and oriented to person, place, and time  Mental status - normal mood, behavior, speech, dress, motor activity, and thought processes  Eyes - sclera anicteric  Neck - supple, no significant adenopathy, thyroid exam: thyroid is normal in size without nodules or tenderness  Chest - clear to auscultation, no wheezes, rales or rhonchi, symmetric air entry  Heart - normal rate, regular rhythm, normal S1, S2, no murmurs, rubs, clicks or gallops, normal bilateral carotid upstroke without bruits, no JVD  Abdomen - soft, nontender, nondistended, no masses or organomegaly  bowel sounds normal  Neurological - alert, oriented, normal speech, no focal findings or movement disorder noted  Extremities - no pedal edema noted, intact peripheral pulses, no edema, redness or tenderness in the calves or thighs  Skin - normal coloration and turgor  R posterior lower scalp sebaceous cyst near the hairline, approx 3 cm diameter, mild erythema, mild to moderate tenderness, no skin breakdown, no drainage, no fluctuance      An electronic signature was used to authenticate this note.   -- Alejo Check, NP

## 2022-03-08 ENCOUNTER — OFFICE VISIT (OUTPATIENT)
Dept: FAMILY MEDICINE CLINIC | Age: 77
End: 2022-03-08
Payer: MEDICARE

## 2022-03-08 VITALS
HEART RATE: 51 BPM | TEMPERATURE: 97.7 F | WEIGHT: 190 LBS | HEIGHT: 70 IN | DIASTOLIC BLOOD PRESSURE: 80 MMHG | SYSTOLIC BLOOD PRESSURE: 135 MMHG | RESPIRATION RATE: 15 BRPM | BODY MASS INDEX: 27.2 KG/M2 | OXYGEN SATURATION: 100 %

## 2022-03-08 DIAGNOSIS — E78.00 ELEVATED LDL CHOLESTEROL LEVEL: ICD-10-CM

## 2022-03-08 DIAGNOSIS — Z00.01 ENCOUNTER FOR ROUTINE ADULT HEALTH EXAMINATION WITH ABNORMAL FINDINGS: Primary | ICD-10-CM

## 2022-03-08 DIAGNOSIS — Z71.89 ADVANCE CARE PLANNING: ICD-10-CM

## 2022-03-08 DIAGNOSIS — I10 ESSENTIAL HYPERTENSION, BENIGN: ICD-10-CM

## 2022-03-08 DIAGNOSIS — M10.9 GOUT, UNSPECIFIED CAUSE, UNSPECIFIED CHRONICITY, UNSPECIFIED SITE: ICD-10-CM

## 2022-03-08 DIAGNOSIS — R79.89 ELEVATED SERUM CREATININE: ICD-10-CM

## 2022-03-08 DIAGNOSIS — R73.9 ELEVATED BLOOD SUGAR: ICD-10-CM

## 2022-03-08 PROCEDURE — G0439 PPPS, SUBSEQ VISIT: HCPCS | Performed by: FAMILY MEDICINE

## 2022-03-08 PROCEDURE — G8536 NO DOC ELDER MAL SCRN: HCPCS | Performed by: FAMILY MEDICINE

## 2022-03-08 PROCEDURE — G8419 CALC BMI OUT NRM PARAM NOF/U: HCPCS | Performed by: FAMILY MEDICINE

## 2022-03-08 PROCEDURE — G8510 SCR DEP NEG, NO PLAN REQD: HCPCS | Performed by: FAMILY MEDICINE

## 2022-03-08 PROCEDURE — 99214 OFFICE O/P EST MOD 30 MIN: CPT | Performed by: FAMILY MEDICINE

## 2022-03-08 PROCEDURE — 1101F PT FALLS ASSESS-DOCD LE1/YR: CPT | Performed by: FAMILY MEDICINE

## 2022-03-08 PROCEDURE — G8752 SYS BP LESS 140: HCPCS | Performed by: FAMILY MEDICINE

## 2022-03-08 PROCEDURE — G8754 DIAS BP LESS 90: HCPCS | Performed by: FAMILY MEDICINE

## 2022-03-08 PROCEDURE — G8427 DOCREV CUR MEDS BY ELIG CLIN: HCPCS | Performed by: FAMILY MEDICINE

## 2022-03-08 NOTE — PROGRESS NOTES
Chief Complaint   Patient presents with    Annual Wellness Visit    Hypertension    Labs     Fasting today     1. Have you been to the ER, urgent care clinic since your last visit? Hospitalized since your last visit? No    2. Have you seen or consulted any other health care providers outside of the [address] [place] [address] since your last visit? Include any pap smears or colon screening. Yes Where: Dermatology    Medicare Wellness Exam:    Chief Complaint   Patient presents with    Annual Wellness Visit    Hypertension    Labs     Fasting today     he is a 67y.o. year old male who presents for evaluation for their Medicare Wellness Visit. Kelsie Luongor is completed and assessed=yes  Depression Screen is completed and assessed=yes  Medication list reviewed and adjusted for accuracy=yes  Immunizations reviewed and updated=yes  Health/Preventative Screenings reviewed and updated=yes  ADL Functions reviewed=yes    Patient Active Problem List    Diagnosis    Elevated LDL cholesterol level    Elevated serum creatinine    Advance care planning     Has LW      Male erectile dysfunction    Essential hypertension, benign    Gout       Reviewed PmHx, RxHx, FmHx, SocHx, AllgHx and updated and dated in the chart. Review of Systems - negative except as listed above in the HPI    Objective:     Vitals:    03/08/22 0811 03/08/22 0834   BP: (!) 165/83 135/80   Pulse: (!) 51    Resp: 15    Temp: 97.7 °F (36.5 °C)    TempSrc: Oral    SpO2: 100%    Weight: 190 lb (86.2 kg)    Height: 5' 10\" (1.778 m)        Assessment/ Plan:   Diagnoses and all orders for this visit:    1. Encounter for routine adult health examination with abnormal findings  -     LIPID PANEL; Future  -     METABOLIC PANEL, COMPREHENSIVE; Future  -     CBC WITH AUTOMATED DIFF; Future  -     TSH 3RD GENERATION; Future  -     HEMOGLOBIN A1C WITH EAG; Future  -     PSA W/ REFLX FREE PSA; Future  -see below    2.  Essential hypertension, benign  - LIPID PANEL; Future  -     METABOLIC PANEL, COMPREHENSIVE; Future  -at goal    3. Gout, unspecified cause, unspecified chronicity, unspecified site  -occ flares    4. Elevated LDL cholesterol level  -     LIPID PANEL; Future  -     METABOLIC PANEL, COMPREHENSIVE; Future    5. Elevated serum creatinine  -     METABOLIC PANEL, COMPREHENSIVE; Future  -mild    6. Advance care planning  -[facility] LW    7. Elevated blood sugar  -     METABOLIC PANEL, COMPREHENSIVE; Future  -     HEMOGLOBIN A1C WITH EAG; Future  Lab Results   Component Value Date/Time    Hemoglobin A1c 5.3 01/24/2019 08:50 AM            -Pain evaluation performed in office  -Cognitive Screen performed in office  -Depression Screen, Fall risks (by up and go test)  and ADL functionality were addressed  -Medication list updated and reviewed for any changes   -A comprehensive review of medical issues and a plan was formulated  -End of life planning was addressed with pt   -Health Screenings for preventions were addressed and a plan was formulated  -Shingles Vaccine was recommended  -Discussed with patient cancer risk factors and appropriate screenings for age  -Patient evaluated for colonoscopy and referred if needed per screeing criteria  -Labs from previous visits were discussed with patient   -Discussed with patient diet and exercise and formulated a plan as needed  -An Advanced care plan was developed with the patient.  -Alcohol screening performed and was negative    -  Follow-up and Dispositions    · Return in about 1 year (around 3/8/2023). I have discussed the diagnosis with the patient and the intended plan as seen in the above orders. The patient understands and agrees with the plan. The patient has received an after-visit summary and questions were answered concerning future plans.      Medication Side Effects and Warnings were discussed with patien  Patient Labs were reviewed and or requested  Patient Past Records were reviewed and or requested    There are no Patient Instructions on file for this visit.       Aditi Simeon M.D.

## 2022-03-09 LAB
ALBUMIN SERPL-MCNC: 3.9 G/DL (ref 3.5–5)
ALBUMIN/GLOB SERPL: 1.2 {RATIO} (ref 1.1–2.2)
ALP SERPL-CCNC: 72 U/L (ref 45–117)
ALT SERPL-CCNC: 27 U/L (ref 12–78)
ANION GAP SERPL CALC-SCNC: 2 MMOL/L (ref 5–15)
AST SERPL-CCNC: 15 U/L (ref 15–37)
BASOPHILS # BLD: 0 K/UL (ref 0–0.1)
BASOPHILS NFR BLD: 1 % (ref 0–1)
BILIRUB SERPL-MCNC: 0.6 MG/DL (ref 0.2–1)
BUN SERPL-MCNC: 24 MG/DL (ref 6–20)
BUN/CREAT SERPL: 18 (ref 12–20)
CALCIUM SERPL-MCNC: 9.4 MG/DL (ref 8.5–10.1)
CHLORIDE SERPL-SCNC: 105 MMOL/L (ref 97–108)
CHOLEST SERPL-MCNC: 216 MG/DL
CO2 SERPL-SCNC: 30 MMOL/L (ref 21–32)
CREAT SERPL-MCNC: 1.37 MG/DL (ref 0.7–1.3)
DIFFERENTIAL METHOD BLD: NORMAL
EOSINOPHIL # BLD: 0.2 K/UL (ref 0–0.4)
EOSINOPHIL NFR BLD: 3 % (ref 0–7)
ERYTHROCYTE [DISTWIDTH] IN BLOOD BY AUTOMATED COUNT: 13.7 % (ref 11.5–14.5)
EST. AVERAGE GLUCOSE BLD GHB EST-MCNC: 100 MG/DL
GLOBULIN SER CALC-MCNC: 3.2 G/DL (ref 2–4)
GLUCOSE SERPL-MCNC: 99 MG/DL (ref 65–100)
HBA1C MFR BLD: 5.1 % (ref 4–5.6)
HCT VFR BLD AUTO: 42.3 % (ref 36.6–50.3)
HDLC SERPL-MCNC: 51 MG/DL
HDLC SERPL: 4.2 {RATIO} (ref 0–5)
HGB BLD-MCNC: 13.8 G/DL (ref 12.1–17)
IMM GRANULOCYTES # BLD AUTO: 0 K/UL (ref 0–0.04)
IMM GRANULOCYTES NFR BLD AUTO: 0 % (ref 0–0.5)
LDLC SERPL CALC-MCNC: 152.4 MG/DL (ref 0–100)
LYMPHOCYTES # BLD: 1.7 K/UL (ref 0.8–3.5)
LYMPHOCYTES NFR BLD: 29 % (ref 12–49)
MCH RBC QN AUTO: 32.2 PG (ref 26–34)
MCHC RBC AUTO-ENTMCNC: 32.6 G/DL (ref 30–36.5)
MCV RBC AUTO: 98.8 FL (ref 80–99)
MONOCYTES # BLD: 0.5 K/UL (ref 0–1)
MONOCYTES NFR BLD: 8 % (ref 5–13)
NEUTS SEG # BLD: 3.5 K/UL (ref 1.8–8)
NEUTS SEG NFR BLD: 59 % (ref 32–75)
NRBC # BLD: 0 K/UL (ref 0–0.01)
NRBC BLD-RTO: 0 PER 100 WBC
PLATELET # BLD AUTO: 230 K/UL (ref 150–400)
PMV BLD AUTO: 10.3 FL (ref 8.9–12.9)
POTASSIUM SERPL-SCNC: 4.6 MMOL/L (ref 3.5–5.1)
PROT SERPL-MCNC: 7.1 G/DL (ref 6.4–8.2)
RBC # BLD AUTO: 4.28 M/UL (ref 4.1–5.7)
SODIUM SERPL-SCNC: 137 MMOL/L (ref 136–145)
TRIGL SERPL-MCNC: 63 MG/DL (ref ?–150)
TSH SERPL DL<=0.05 MIU/L-ACNC: 1.33 UIU/ML (ref 0.36–3.74)
VLDLC SERPL CALC-MCNC: 12.6 MG/DL
WBC # BLD AUTO: 5.8 K/UL (ref 4.1–11.1)

## 2022-03-10 LAB
% FREE PSA, 480797: 18.4 %
PSA SERPL-MCNC: 4.3 NG/ML (ref 0–4)
PSA, FREE, 480853: 0.79 NG/ML
REFLEX CRITERIA: ABNORMAL

## 2022-03-14 NOTE — PROGRESS NOTES
Please contact patient regarding their mychart resulted labs. They have not reviewed them to date.       Dr. WALLIS [facility]

## 2022-03-15 NOTE — PROGRESS NOTES
Please contact patient regarding their mychart resulted labs. They have not reviewed them to date.       Dr. Lisa Moya

## 2022-03-18 PROBLEM — E78.00 ELEVATED LDL CHOLESTEROL LEVEL: Status: ACTIVE | Noted: 2022-03-08

## 2022-03-19 PROBLEM — R79.89 ELEVATED SERUM CREATININE: Status: ACTIVE | Noted: 2022-03-08

## 2022-05-04 ENCOUNTER — TELEPHONE (OUTPATIENT)
Dept: FAMILY MEDICINE CLINIC | Age: 77
End: 2022-05-04

## 2022-05-04 NOTE — TELEPHONE ENCOUNTER
Returned call to patient. He did not really know what the 5 day kit was, but had heard about it from some of his friends. Patient is not feeling bad at the moment. Encouraged fluids, rest, tylenol for aches and fever, vit C and zinc to help support immune system. Patient verbalizes understanding.

## 2022-05-04 NOTE — TELEPHONE ENCOUNTER
----- Message from [facility] sent at 5/4/2022  9:34 AM EDT -----  Subject: Message to Provider    QUESTIONS  Information for Provider? Patient said he tested positive for covid 19 and   he is asking if the doctor can send a 5 day kit to [address] [place] [address], [address] [place] [address]. Patient would like a call once done.  ---------------------------------------------------------------------------  --------------  CALL BACK INFO  What is the best way for the office to contact you? OK to leave message on   voicemail  Preferred Call Back Phone Number? [phone]  ---------------------------------------------------------------------------  --------------  SCRIPT ANSWERS  Relationship to Patient?  Self

## 2022-05-09 RX ORDER — LISINOPRIL 20 MG/1
TABLET ORAL
Qty: 90 TABLET | Refills: 3 | Status: SHIPPED | OUTPATIENT
Start: 2022-05-09

## 2022-07-09 DIAGNOSIS — M10.9 GOUT, UNSPECIFIED CAUSE, UNSPECIFIED CHRONICITY, UNSPECIFIED SITE: ICD-10-CM

## 2022-07-11 RX ORDER — ALLOPURINOL 100 MG/1
TABLET ORAL
Qty: 90 TABLET | Refills: 3 | Status: SHIPPED | OUTPATIENT
Start: 2022-07-11

## 2022-09-16 RX ORDER — TAMSULOSIN HYDROCHLORIDE 0.4 MG/1
0.4 CAPSULE ORAL DAILY
Qty: 90 CAPSULE | Refills: 1 | Status: SHIPPED | OUTPATIENT
Start: 2022-09-16

## 2022-12-06 RX ORDER — LORATADINE 10 MG/1
TABLET ORAL
Qty: 90 TABLET | Refills: 1 | Status: SHIPPED | OUTPATIENT
Start: 2022-12-06

## 2022-12-07 RX ORDER — INDOMETHACIN 25 MG/1
25 CAPSULE ORAL 3 TIMES DAILY
Qty: 90 CAPSULE | Refills: 2 | Status: SHIPPED | OUTPATIENT
Start: 2022-12-07 | End: 2022-12-08 | Stop reason: SDUPTHER

## 2022-12-09 RX ORDER — INDOMETHACIN 25 MG/1
25 CAPSULE ORAL 3 TIMES DAILY
Qty: 90 CAPSULE | Refills: 2 | Status: SHIPPED | OUTPATIENT
Start: 2022-12-09 | End: 2023-03-09

## 2023-01-31 RX ORDER — DICLOFENAC SODIUM 75 MG/1
TABLET, DELAYED RELEASE ORAL
Qty: 180 TABLET | Refills: 1 | Status: SHIPPED | OUTPATIENT
Start: 2023-01-31

## 2023-03-20 ENCOUNTER — HOSPITAL ENCOUNTER (EMERGENCY)
Age: 78
Discharge: HOME | End: 2023-03-20
Attending: STUDENT IN AN ORGANIZED HEALTH CARE EDUCATION/TRAINING PROGRAM
Payer: MEDICARE

## 2023-03-20 VITALS
OXYGEN SATURATION: 99 % | TEMPERATURE: 97.6 F | BODY MASS INDEX: 27.38 KG/M2 | HEART RATE: 53 BPM | RESPIRATION RATE: 13 BRPM | SYSTOLIC BLOOD PRESSURE: 165 MMHG | DIASTOLIC BLOOD PRESSURE: 83 MMHG | HEIGHT: 71 IN | WEIGHT: 195.55 LBS

## 2023-03-20 DIAGNOSIS — R42 DIZZINESS: Primary | ICD-10-CM

## 2023-03-20 LAB
ANION GAP SERPL CALC-SCNC: 2 MMOL/L (ref 5–15)
ATRIAL RATE: 60 BPM
BASOPHILS # BLD: 0 K/UL (ref 0–0.1)
BASOPHILS NFR BLD: 1 % (ref 0–1)
BUN SERPL-MCNC: 28 MG/DL (ref 6–20)
BUN/CREAT SERPL: 19 (ref 12–20)
CALCIUM SERPL-MCNC: 9.2 MG/DL (ref 8.5–10.1)
CALCULATED P AXIS, ECG09: 62 DEGREES
CALCULATED R AXIS, ECG10: -33 DEGREES
CALCULATED T AXIS, ECG11: 80 DEGREES
CHLORIDE SERPL-SCNC: 109 MMOL/L (ref 97–108)
CO2 SERPL-SCNC: 29 MMOL/L (ref 21–32)
CREAT SERPL-MCNC: 1.51 MG/DL (ref 0.7–1.3)
DIAGNOSIS, 93000: NORMAL
DIFFERENTIAL METHOD BLD: NORMAL
EOSINOPHIL # BLD: 0.2 K/UL (ref 0–0.4)
EOSINOPHIL NFR BLD: 3 % (ref 0–7)
ERYTHROCYTE [DISTWIDTH] IN BLOOD BY AUTOMATED COUNT: 12.8 % (ref 11.5–14.5)
GLUCOSE SERPL-MCNC: 112 MG/DL (ref 65–100)
HCT VFR BLD AUTO: 41.9 % (ref 36.6–50.3)
HGB BLD-MCNC: 14.5 G/DL (ref 12.1–17)
IMM GRANULOCYTES # BLD AUTO: 0 K/UL (ref 0–0.04)
IMM GRANULOCYTES NFR BLD AUTO: 0 % (ref 0–0.5)
LYMPHOCYTES # BLD: 1.6 K/UL (ref 0.8–3.5)
LYMPHOCYTES NFR BLD: 25 % (ref 12–49)
MAGNESIUM SERPL-MCNC: 2.1 MG/DL (ref 1.6–2.4)
MCH RBC QN AUTO: 32.6 PG (ref 26–34)
MCHC RBC AUTO-ENTMCNC: 34.6 G/DL (ref 30–36.5)
MCV RBC AUTO: 94.2 FL (ref 80–99)
MONOCYTES # BLD: 0.5 K/UL (ref 0–1)
MONOCYTES NFR BLD: 8 % (ref 5–13)
NEUTS SEG # BLD: 4.1 K/UL (ref 1.8–8)
NEUTS SEG NFR BLD: 63 % (ref 32–75)
NRBC # BLD: 0 K/UL (ref 0–0.01)
NRBC BLD-RTO: 0 PER 100 WBC
P-R INTERVAL, ECG05: 164 MS
PLATELET # BLD AUTO: 211 K/UL (ref 150–400)
PMV BLD AUTO: 9.2 FL (ref 8.9–12.9)
POTASSIUM SERPL-SCNC: 4.4 MMOL/L (ref 3.5–5.1)
Q-T INTERVAL, ECG07: 376 MS
QRS DURATION, ECG06: 94 MS
QTC CALCULATION (BEZET), ECG08: 376 MS
RBC # BLD AUTO: 4.45 M/UL (ref 4.1–5.7)
SODIUM SERPL-SCNC: 140 MMOL/L (ref 136–145)
TROPONIN I SERPL HS-MCNC: 7 NG/L (ref 0–76)
VENTRICULAR RATE, ECG03: 60 BPM
WBC # BLD AUTO: 6.4 K/UL (ref 4.1–11.1)

## 2023-03-20 PROCEDURE — 80048 BASIC METABOLIC PNL TOTAL CA: CPT

## 2023-03-20 PROCEDURE — 84484 ASSAY OF TROPONIN QUANT: CPT

## 2023-03-20 PROCEDURE — 99284 EMERGENCY DEPT VISIT MOD MDM: CPT

## 2023-03-20 PROCEDURE — 36415 COLL VENOUS BLD VENIPUNCTURE: CPT

## 2023-03-20 PROCEDURE — 85025 COMPLETE CBC W/AUTO DIFF WBC: CPT

## 2023-03-20 PROCEDURE — 96360 HYDRATION IV INFUSION INIT: CPT

## 2023-03-20 PROCEDURE — 74011250637 HC RX REV CODE- 250/637: Performed by: STUDENT IN AN ORGANIZED HEALTH CARE EDUCATION/TRAINING PROGRAM

## 2023-03-20 PROCEDURE — 74011250636 HC RX REV CODE- 250/636: Performed by: STUDENT IN AN ORGANIZED HEALTH CARE EDUCATION/TRAINING PROGRAM

## 2023-03-20 PROCEDURE — 93005 ELECTROCARDIOGRAM TRACING: CPT

## 2023-03-20 PROCEDURE — 83735 ASSAY OF MAGNESIUM: CPT

## 2023-03-20 RX ORDER — MECLIZINE HYDROCHLORIDE 25 MG/1
25 TABLET ORAL
Qty: 20 TABLET | Refills: 0 | Status: SHIPPED | OUTPATIENT
Start: 2023-03-20 | End: 2023-03-30

## 2023-03-20 RX ORDER — LISINOPRIL 20 MG/1
20 TABLET ORAL
Status: COMPLETED | OUTPATIENT
Start: 2023-03-20 | End: 2023-03-20

## 2023-03-20 RX ORDER — MECLIZINE HYDROCHLORIDE 25 MG/1
50 TABLET ORAL
Status: COMPLETED | OUTPATIENT
Start: 2023-03-20 | End: 2023-03-20

## 2023-03-20 RX ADMIN — MECLIZINE HYDROCHLORIDE 50 MG: 25 TABLET ORAL at 06:36

## 2023-03-20 RX ADMIN — LISINOPRIL 20 MG: 20 TABLET ORAL at 06:36

## 2023-03-20 RX ADMIN — SODIUM CHLORIDE 500 ML: 9 INJECTION, SOLUTION INTRAVENOUS at 06:36

## 2023-03-20 NOTE — ED NOTES
Discharge instructions reviewed with pt. No questions or concerns.  Pt ambulated to lobby without difficulty

## 2023-03-20 NOTE — DISCHARGE INSTRUCTIONS
This is a 79-year-old male with a history of hypertension increase fluid intake and continue meclizine as needed for dizziness. Follow-up with Dr. Cooper Leon as soon as possible to be reevaluated. Return immediately to the ER for worsening dizziness, headache, loss of vision, neck pain, vomiting, weakness in an arm or a leg, confusion or decreased responsiveness, dizziness, or difficulty walking.

## 2023-03-20 NOTE — ED PROVIDER NOTES
67-year-old male with history of HTN presents to the ED with chief complaint of episodic dizziness for the past 2 to 3 days. Describes dizziness as room spinning. Symptoms are worse with sitting up and sudden movements, resolved with rest.  Patient does note that symptoms also seem to resolve when he is exercising outside and seem to be worse when he is sitting down or in bed and moves. He notices it most when he gets up at night to go to the bathroom. Currently asymptomatic. No nausea, vomiting, vision changes, numbness, weakness, speech difficulty, chest pain, difficulty breathing, fevers, headache. No recent falls. Patient does have history of similar dizziness in the past which has been attributed to dehydration. Denies any recent vomiting or diarrhea. No treatment prior to arrival.    The history is provided by the patient. Dizziness  Pertinent negatives include no shortness of breath and no chest pain. Past Medical History:   Diagnosis Date    Arthritis     gout    Family history of skin cancer     Gout 11/13/2013    Hypertension     Radiation exposure     Skin cancer     Sun-damaged skin        Past Surgical History:   Procedure Laterality Date    COLONOSCOPY  11/20/2014         HX COLONOSCOPY  2006 2015 1 polyp,     HX HEENT      oral surgery    HX MOHS PROCEDURES  05/30/2018    Recurrent BCC L chest by Dr. Herberth Vargas          Family History:   Problem Relation Age of Onset    Psychiatric Disorder Mother     Lung Disease Father        Social History     Socioeconomic History    Marital status:      Spouse name: Not on file    Number of children: Not on file    Years of education: Not on file    Highest education level: Not on file   Occupational History    Not on file   Tobacco Use    Smoking status: Never    Smokeless tobacco: Never   Vaping Use    Vaping Use: Never used   Substance and Sexual Activity    Alcohol use:  Yes     Alcohol/week: 1.7 standard drinks     Types: 1 Glasses of wine, 1 Cans of beer per week     Comment: 2-3 times a week    Drug use: No    Sexual activity: Never   Other Topics Concern    Not on file   Social History Narrative    Not on file     Social Determinants of Health     Financial Resource Strain: Not on file   Food Insecurity: Not on file   Transportation Needs: Not on file   Physical Activity: Not on file   Stress: Not on file   Social Connections: Not on file   Intimate Partner Violence: Not on file   Housing Stability: Not on file         ALLERGIES: Patient has no known allergies. Review of Systems   Respiratory:  Negative for shortness of breath. Cardiovascular:  Negative for chest pain. Neurological:  Positive for dizziness. Vitals:    03/20/23 0613 03/20/23 0617 03/20/23 0636   BP: (!) 180/75  (!) 165/83   Pulse: (!) 58 67 62   Resp: 13     Temp: 97.6 °F (36.4 °C)     SpO2: 99%     Weight: 88.7 kg (195 lb 8.8 oz)     Height: 5' 11\" (1.803 m)              Physical Exam  Constitutional:       General: He is not in acute distress. Appearance: He is well-developed. HENT:      Head: Normocephalic and atraumatic. Eyes:      Extraocular Movements: Extraocular movements intact. Conjunctiva/sclera: Conjunctivae normal.      Pupils: Pupils are equal, round, and reactive to light. Comments: No nystagmus   Neck:      Trachea: No tracheal deviation. Cardiovascular:      Rate and Rhythm: Normal rate and regular rhythm. Heart sounds: No murmur heard. No friction rub. No gallop. Pulmonary:      Effort: No respiratory distress. Breath sounds: Normal breath sounds. Abdominal:      General: Bowel sounds are normal. There is no distension. Palpations: Abdomen is soft. Tenderness: There is no abdominal tenderness. Musculoskeletal:         General: No deformity. Cervical back: Neck supple. Skin:     General: Skin is warm and dry.    Neurological:      Mental Status: He is alert and oriented to person, place, and time.      Cranial Nerves: No cranial nerve deficit. Sensory: No sensory deficit. Motor: No weakness. Gait: Gait normal.        Medical Decision Making  74-year-old male presenting to the ED with episodic room spinning dizziness. Differential includes BPPV, dehydration, electrolyte abnormality, viral illness, less likely ACS. EKG nonischemic. Patient bradycardic at baseline. Has not taken his blood pressure medication this morning. Neurologic exam is benign, given episodic nature and reassuring exam do not suspect stroke or other central neurologic process. Will check basic labs, troponin, treat with 500 cc bolus and meclizine and reevaluate. If work-up negative and patient is able to tolerate road test will plan on discharge with meclizine prescription and primary care follow-up. Amount and/or Complexity of Data Reviewed  Labs: ordered. ECG/medicine tests: ordered and independent interpretation performed. Decision-making details documented in ED Course. Risk  Prescription drug management. ED Course as of 03/20/23 0639   Mon Mar 20, 2023   0618 ED EKG interpretation:6:19 AM  Rhythm: normal sinus rhythm;  Rate (approx.): 60; Axis: normal; QRS interval: normal ; ST/T wave: non-specific changes   [JW]      ED Course User Index  [JW] Kushal Madden MD       Procedures    7:01 AM  Change of shift. Care of patient signed over to Dr. Lenny Coleman. Bedside handoff complete.     Signed By: Holger Boyd MD     March 20, 2023

## 2023-03-20 NOTE — ED NOTES
Patient signed out to me by Dr. Sophia Rosen. I reviewed his labs which were within normal limits. Prior to my reassessment he had received IV fluids and meclizine, and he now feels much better. Dizziness has since resolved. Walked without recurrent symptoms. VS stable. Just took his lisinopril this morning. Per Dr. Sophia Rosen his neurologic exam was nonfocal and there was low suspicion for posterior circulation stroke based on his assessment. As per the communicated plan will discharge home and have him follow up with ENT to be reevaluated, increase fluid intake and use meclizine symptomatically in the meantime. The patient has been given verbal and written advice regarding today's presentation including reasons to re-attend, specifically signs and symptoms of concern or worsening condition were discussed and understood by the patient.

## 2023-03-31 RX ORDER — INDOMETHACIN 25 MG/1
CAPSULE ORAL
Qty: 270 CAPSULE | Refills: 0 | Status: SHIPPED | OUTPATIENT
Start: 2023-03-31

## 2023-04-17 RX ORDER — TAMSULOSIN HYDROCHLORIDE 0.4 MG/1
CAPSULE ORAL
Qty: 90 CAPSULE | Refills: 1 | Status: SHIPPED | OUTPATIENT
Start: 2023-04-17

## 2023-05-19 RX ORDER — TAMSULOSIN HYDROCHLORIDE 0.4 MG/1
1 CAPSULE ORAL DAILY
COMMUNITY
Start: 2023-04-17

## 2023-05-19 RX ORDER — LORATADINE 10 MG/1
1 TABLET ORAL DAILY
COMMUNITY
Start: 2022-12-06

## 2023-05-19 RX ORDER — DICLOFENAC SODIUM 75 MG/1
TABLET, DELAYED RELEASE ORAL
COMMUNITY
Start: 2023-01-31

## 2023-05-19 RX ORDER — LISINOPRIL 20 MG/1
1 TABLET ORAL DAILY
COMMUNITY
Start: 2022-05-09 | End: 2023-06-28

## 2023-05-19 RX ORDER — ALLOPURINOL 100 MG/1
1 TABLET ORAL DAILY
COMMUNITY
Start: 2022-07-11

## 2023-05-19 RX ORDER — INDOMETHACIN 25 MG/1
CAPSULE ORAL
COMMUNITY
Start: 2023-03-31

## 2023-05-19 RX ORDER — FOLIC ACID 1 MG/1
1 TABLET ORAL DAILY
COMMUNITY

## 2023-05-19 RX ORDER — SILDENAFIL 50 MG/1
50 TABLET, FILM COATED ORAL PRN
COMMUNITY
Start: 2019-04-02

## 2023-05-19 RX ORDER — MAGNESIUM CARB/ALUMINUM HYDROX 105-160MG
1 TABLET,CHEWABLE ORAL DAILY
COMMUNITY

## 2023-06-28 RX ORDER — LISINOPRIL 20 MG/1
TABLET ORAL
Qty: 90 TABLET | Refills: 1 | Status: SHIPPED | OUTPATIENT
Start: 2023-06-28

## 2023-06-29 ENCOUNTER — HOSPITAL ENCOUNTER (OUTPATIENT)
Facility: HOSPITAL | Age: 78
Discharge: HOME | End: 2023-06-29
Attending: SPECIALIST
Payer: MEDICARE

## 2023-06-29 VITALS — BODY MASS INDEX: 27.3 KG/M2 | HEIGHT: 71 IN | WEIGHT: 195 LBS

## 2023-06-29 DIAGNOSIS — H90.3 ASYMMETRIC SNHL (SENSORINEURAL HEARING LOSS): ICD-10-CM

## 2023-06-29 PROCEDURE — A9579 GAD-BASE MR CONTRAST NOS,1ML: HCPCS | Performed by: RADIOLOGY

## 2023-06-29 PROCEDURE — 6360000004 HC RX CONTRAST MEDICATION: Performed by: RADIOLOGY

## 2023-06-29 PROCEDURE — 70553 MRI BRAIN STEM W/O & W/DYE: CPT

## 2023-06-29 RX ADMIN — GADOTERIDOL 17 ML: 279.3 INJECTION, SOLUTION INTRAVENOUS at 15:23

## 2023-07-25 ENCOUNTER — TELEPHONE (OUTPATIENT)
Age: 78
End: 2023-07-25

## 2023-07-25 RX ORDER — LORATADINE 10 MG/1
10 TABLET ORAL DAILY
Qty: 90 TABLET | Refills: 3 | Status: SHIPPED | OUTPATIENT
Start: 2023-07-25

## 2023-07-25 NOTE — TELEPHONE ENCOUNTER
We received a fax refill request for [facility]. Please escribe Loratadine to their pharmacy. The pharmacy is correct in the chart and they are requesting a 90 day supply. Tried to call patient @ [phone] several times but someone kept hanging up phone before I could leave a message.

## 2023-07-26 RX ORDER — ALLOPURINOL 100 MG/1
TABLET ORAL
Qty: 90 TABLET | Refills: 3 | Status: SHIPPED | OUTPATIENT
Start: 2023-07-26

## 2023-08-09 ENCOUNTER — TELEPHONE (OUTPATIENT)
Age: 78
End: 2023-08-09

## 2023-08-09 RX ORDER — DICLOFENAC SODIUM 75 MG/1
TABLET, DELAYED RELEASE ORAL
Qty: 90 TABLET | Refills: 2 | Status: SHIPPED | OUTPATIENT
Start: 2023-08-09

## 2023-08-09 NOTE — TELEPHONE ENCOUNTER
[facility] needs a refill of diclofenac. They have 0 pills/supply left and are requesting a 90 day supply with refills. Pharmacy has been updated in the chart. Patient was advised or scheduled an appointment for the future and to request refills thru the [facility] or by requesting a refill from their pharmacy in the future. Patient was also advised to check with their pharmacy for status of when refills are available.

## 2023-10-23 RX ORDER — DICLOFENAC SODIUM 75 MG/1
TABLET, DELAYED RELEASE ORAL
Qty: 180 TABLET | Refills: 10 | Status: SHIPPED | OUTPATIENT
Start: 2023-10-23

## 2023-11-22 RX ORDER — TAMSULOSIN HYDROCHLORIDE 0.4 MG/1
0.4 CAPSULE ORAL DAILY
Qty: 90 CAPSULE | Refills: 3 | Status: SHIPPED | OUTPATIENT
Start: 2023-11-22

## 2023-12-11 NOTE — H&P
encounter. Past Medical History:   Diagnosis Date    Arthritis     gout    Asthma     childhood    CKD (chronic kidney disease)     Family history of skin cancer     Gout 11/13/2013    [place] (hard of hearing)     deaf in left ear, does not wear hearing aids    Hypertension     Personal history of radiation exposure     childhood radiation treatment for acne    Skin cancer     basal cell under neck    Sun-damaged skin      Past Surgical History:   Procedure Laterality Date    COLONOSCOPY  11/20/2014         COLONOSCOPY  2006 2015 1 polyp,     HEENT      oral surgery    MOHS SURGERY  05/30/2018    Recurrent BCC L chest by Dr. Chintan Ocampo History     Tobacco Use    Smoking status: Never    Smokeless tobacco: Never   Vaping Use    Vaping Use: Never used   Substance Use Topics    Alcohol use: Yes     Alcohol/week: 2.0 standard drinks of alcohol     Types: 2 Glasses of wine per week    Drug use: No     Family History   Problem Relation Age of Onset    Lung Disease Father     Psychiatric Disorder Mother         Pre-Operative Risk Assessment Using 2014 ACC/AHA Guidelines     Emergent procedure: No  Active Cardiac Condition including decompensated HF, Arrhythmia, MI <3 weeks, severe valve disease: No  Risk Level of Procedure:  intermediate risk  Revised Cardiac Risk Index Risk factors: None 3.9% risk of cardiac complications during or around noncardiac surgery (30 day risk of Death, MI or Cardiac arrest). Measurement of Exercise Tolerance before Surgery >4 METS (climbing > 1 flight of stairs without stopping, walking up hill > 1-2 blocks, scrubbing floors, moving furniture, golf, bowling, dancing or tennis, or running short distance): Yes    History of cardiac (including arrhythmic or valvular) or lung issues? No  Personal or family of bleeding issues? No  Hx of HTN? yes        Controlled? Yes     Smoker? No  Etoh or other substance use?  2 glasses of wine weekly     On anticoagulation, insulin, or steroids?

## 2023-12-11 NOTE — H&P (VIEW-ONLY)
Keven Michele was referred for evaluation by:Dr. Ramesh Hogue for Pre- Op Evaluation.  Please see encounter details and orders for consultative summary.    Type of surgery : Left Total Knee Replacement  Surgery site : Left knee  Anesthesia type: Spinal/Regional block  Date of procedure:  01/09/2024    This 78 y.o. year old male presents with complaints of left knee pain for the past several years.  Has been noting some instability to lefft knee especially after he stepped in hole this past summer and twisted left knee.  Conservative measures including medication management, activity modification, physical therapy and injection therapy have been exhausted prior to the decision for surgery. Patient has discussed the risks, alternatives, and benefits of the surgery with surgeon and has elected to proceed with surgical intervention.    Review of labs showed history of compromised renal function.  Takes NSAIDs on a regular basis.    PCP: Dr Aneudy Benton    Allergies: No Known Allergies  Latex allergy: no  Prior reactions to anesthesia:  None     Current Outpatient Medications   Medication Sig    Cyanocobalamin (VITAMIN B 12 PO) Take 500 mg by mouth daily    NONFORMULARY Take 1 Dose by mouth 2 times daily LIMBEX JOINT FORMULARY    DICLOFENAC PO Take 75 mg by mouth as needed (pain)    NONFORMULARY Take 1 Dose by mouth in the morning and at bedtime Juvenon-blood flow-7    Omega-3 Fatty Acids (FISH OIL) 1200 MG CAPS Take 1,200 mg by mouth daily    diclofenac (VOLTAREN) 75 MG EC tablet TAKE 1 TABLET TWICE DAILY FOR 14 DAYS AS DIRECTED    allopurinol (ZYLOPRIM) 100 MG tablet TAKE 1 TABLET EVERY DAY    loratadine (CLARITIN) 10 MG tablet Take 1 tablet by mouth daily    lisinopril (PRINIVIL;ZESTRIL) 20 MG tablet TAKE 1 TABLET EVERY DAY    folic acid (FOLVITE) 1 MG tablet Take 1 tablet by mouth daily    indomethacin (INDOCIN) 25 MG capsule TAKE 1 CAPSULE THREE TIMES DAILY     No current facility-administered medications for this

## 2023-12-13 ENCOUNTER — HOSPITAL ENCOUNTER (OUTPATIENT)
Facility: HOSPITAL | Age: 78
Discharge: HOME | End: 2023-12-16
Payer: MEDICARE

## 2023-12-13 VITALS
SYSTOLIC BLOOD PRESSURE: 160 MMHG | HEIGHT: 71 IN | TEMPERATURE: 98 F | OXYGEN SATURATION: 100 % | WEIGHT: 194.6 LBS | BODY MASS INDEX: 27.24 KG/M2 | DIASTOLIC BLOOD PRESSURE: 80 MMHG | HEART RATE: 59 BPM | RESPIRATION RATE: 12 BRPM

## 2023-12-13 LAB
ALBUMIN SERPL-MCNC: 4.2 G/DL (ref 3.5–5)
ALBUMIN/GLOB SERPL: 1.2 (ref 1.1–2.2)
ALP SERPL-CCNC: 67 U/L (ref 45–117)
ALT SERPL-CCNC: 28 U/L (ref 12–78)
ANION GAP SERPL CALC-SCNC: 6 MMOL/L (ref 5–15)
APPEARANCE UR: CLEAR
AST SERPL-CCNC: 17 U/L (ref 15–37)
BACTERIA URNS QL MICRO: NEGATIVE /HPF
BASOPHILS # BLD: 0 K/UL (ref 0–0.1)
BASOPHILS NFR BLD: 0 % (ref 0–1)
BILIRUB SERPL-MCNC: 0.7 MG/DL (ref 0.2–1)
BILIRUB UR QL: NEGATIVE
BUN SERPL-MCNC: 33 MG/DL (ref 6–20)
BUN/CREAT SERPL: 23 (ref 12–20)
CALCIUM SERPL-MCNC: 9 MG/DL (ref 8.5–10.1)
CHLORIDE SERPL-SCNC: 106 MMOL/L (ref 97–108)
CO2 SERPL-SCNC: 28 MMOL/L (ref 21–32)
COLOR UR: NORMAL
CREAT SERPL-MCNC: 1.46 MG/DL (ref 0.7–1.3)
DIFFERENTIAL METHOD BLD: NORMAL
EOSINOPHIL # BLD: 0.2 K/UL (ref 0–0.4)
EOSINOPHIL NFR BLD: 2 % (ref 0–7)
EPITH CASTS URNS QL MICRO: NORMAL /LPF
ERYTHROCYTE [DISTWIDTH] IN BLOOD BY AUTOMATED COUNT: 13.5 % (ref 11.5–14.5)
EST. AVERAGE GLUCOSE BLD GHB EST-MCNC: 100 MG/DL
GLOBULIN SER CALC-MCNC: 3.4 G/DL (ref 2–4)
GLUCOSE SERPL-MCNC: 91 MG/DL (ref 65–100)
GLUCOSE UR STRIP.AUTO-MCNC: NEGATIVE MG/DL
HBA1C MFR BLD: 5.1 % (ref 4–5.6)
HCT VFR BLD AUTO: 44.6 % (ref 36.6–50.3)
HGB BLD-MCNC: 15.2 G/DL (ref 12.1–17)
HGB UR QL STRIP: NEGATIVE
HYALINE CASTS URNS QL MICRO: NORMAL /LPF (ref 0–2)
IMM GRANULOCYTES # BLD AUTO: 0 K/UL (ref 0–0.04)
IMM GRANULOCYTES NFR BLD AUTO: 0 % (ref 0–0.5)
KETONES UR QL STRIP.AUTO: NEGATIVE MG/DL
LEUKOCYTE ESTERASE UR QL STRIP.AUTO: NEGATIVE
LYMPHOCYTES # BLD: 1.8 K/UL (ref 0.8–3.5)
LYMPHOCYTES NFR BLD: 19 % (ref 12–49)
MCH RBC QN AUTO: 32.5 PG (ref 26–34)
MCHC RBC AUTO-ENTMCNC: 34.1 G/DL (ref 30–36.5)
MCV RBC AUTO: 95.5 FL (ref 80–99)
MONOCYTES # BLD: 0.6 K/UL (ref 0–1)
MONOCYTES NFR BLD: 7 % (ref 5–13)
NEUTS SEG # BLD: 6.4 K/UL (ref 1.8–8)
NEUTS SEG NFR BLD: 72 % (ref 32–75)
NITRITE UR QL STRIP.AUTO: NEGATIVE
NRBC # BLD: 0 K/UL (ref 0–0.01)
NRBC BLD-RTO: 0 PER 100 WBC
PH UR STRIP: 5.5 (ref 5–8)
PLATELET # BLD AUTO: 260 K/UL (ref 150–400)
PMV BLD AUTO: 9.8 FL (ref 8.9–12.9)
POTASSIUM SERPL-SCNC: 4.4 MMOL/L (ref 3.5–5.1)
PROT SERPL-MCNC: 7.6 G/DL (ref 6.4–8.2)
PROT UR STRIP-MCNC: NEGATIVE MG/DL
RBC # BLD AUTO: 4.67 M/UL (ref 4.1–5.7)
RBC #/AREA URNS HPF: NORMAL /HPF (ref 0–5)
SODIUM SERPL-SCNC: 140 MMOL/L (ref 136–145)
SP GR UR REFRACTOMETRY: 1.02 (ref 1–1.03)
URINE CULTURE IF INDICATED: NORMAL
UROBILINOGEN UR QL STRIP.AUTO: 0.2 EU/DL (ref 0.2–1)
WBC # BLD AUTO: 9.1 K/UL (ref 4.1–11.1)
WBC URNS QL MICRO: NORMAL /HPF (ref 0–4)

## 2023-12-13 PROCEDURE — 85025 COMPLETE CBC W/AUTO DIFF WBC: CPT

## 2023-12-13 PROCEDURE — 36415 COLL VENOUS BLD VENIPUNCTURE: CPT

## 2023-12-13 PROCEDURE — 81001 URINALYSIS AUTO W/SCOPE: CPT

## 2023-12-13 PROCEDURE — 83036 HEMOGLOBIN GLYCOSYLATED A1C: CPT

## 2023-12-13 PROCEDURE — 93005 ELECTROCARDIOGRAM TRACING: CPT | Performed by: ORTHOPAEDIC SURGERY

## 2023-12-13 PROCEDURE — 80053 COMPREHEN METABOLIC PANEL: CPT

## 2023-12-13 RX ORDER — AMOXICILLIN 500 MG
1 CAPSULE ORAL DAILY
COMMUNITY

## 2023-12-14 ENCOUNTER — OFFICE VISIT (OUTPATIENT)
Age: 78
End: 2023-12-14
Payer: MEDICARE

## 2023-12-14 VITALS
SYSTOLIC BLOOD PRESSURE: 149 MMHG | TEMPERATURE: 97.6 F | RESPIRATION RATE: 20 BRPM | HEART RATE: 62 BPM | WEIGHT: 192 LBS | BODY MASS INDEX: 26.88 KG/M2 | OXYGEN SATURATION: 100 % | HEIGHT: 71 IN | DIASTOLIC BLOOD PRESSURE: 88 MMHG

## 2023-12-14 DIAGNOSIS — N18.30 STAGE 3 CHRONIC KIDNEY DISEASE, UNSPECIFIED WHETHER STAGE 3A OR 3B CKD (HCC): ICD-10-CM

## 2023-12-14 DIAGNOSIS — I10 ESSENTIAL (PRIMARY) HYPERTENSION: Primary | ICD-10-CM

## 2023-12-14 PROBLEM — M17.12 PRIMARY OSTEOARTHRITIS OF LEFT KNEE: Status: ACTIVE | Noted: 2023-12-14

## 2023-12-14 LAB
BACTERIA SPEC CULT: NORMAL
BACTERIA SPEC CULT: NORMAL
EKG ATRIAL RATE: 62 BPM
EKG DIAGNOSIS: NORMAL
EKG P AXIS: 51 DEGREES
EKG P-R INTERVAL: 180 MS
EKG Q-T INTERVAL: 394 MS
EKG QRS DURATION: 92 MS
EKG QTC CALCULATION (BAZETT): 399 MS
EKG R AXIS: -31 DEGREES
EKG T AXIS: 63 DEGREES
EKG VENTRICULAR RATE: 62 BPM
SERVICE CMNT-IMP: NORMAL

## 2023-12-14 PROCEDURE — 99214 OFFICE O/P EST MOD 30 MIN: CPT | Performed by: PHYSICIAN ASSISTANT

## 2023-12-14 PROCEDURE — 1123F ACP DISCUSS/DSCN MKR DOCD: CPT | Performed by: PHYSICIAN ASSISTANT

## 2023-12-14 PROCEDURE — G8419 CALC BMI OUT NRM PARAM NOF/U: HCPCS | Performed by: PHYSICIAN ASSISTANT

## 2023-12-14 PROCEDURE — G8427 DOCREV CUR MEDS BY ELIG CLIN: HCPCS | Performed by: PHYSICIAN ASSISTANT

## 2023-12-14 PROCEDURE — 3077F SYST BP >= 140 MM HG: CPT | Performed by: PHYSICIAN ASSISTANT

## 2023-12-14 PROCEDURE — G8484 FLU IMMUNIZE NO ADMIN: HCPCS | Performed by: PHYSICIAN ASSISTANT

## 2023-12-14 PROCEDURE — 1036F TOBACCO NON-USER: CPT | Performed by: PHYSICIAN ASSISTANT

## 2023-12-14 PROCEDURE — 3079F DIAST BP 80-89 MM HG: CPT | Performed by: PHYSICIAN ASSISTANT

## 2023-12-14 PROCEDURE — 93010 ELECTROCARDIOGRAM REPORT: CPT | Performed by: SPECIALIST

## 2023-12-14 SDOH — ECONOMIC STABILITY: FOOD INSECURITY: WITHIN THE PAST 12 MONTHS, YOU WORRIED THAT YOUR FOOD WOULD RUN OUT BEFORE YOU GOT MONEY TO BUY MORE.: NEVER TRUE

## 2023-12-14 SDOH — ECONOMIC STABILITY: INCOME INSECURITY: HOW HARD IS IT FOR YOU TO PAY FOR THE VERY BASICS LIKE FOOD, HOUSING, MEDICAL CARE, AND HEATING?: NOT HARD AT ALL

## 2023-12-14 SDOH — ECONOMIC STABILITY: HOUSING INSECURITY
IN THE LAST 12 MONTHS, WAS THERE A TIME WHEN YOU DID NOT HAVE A STEADY PLACE TO SLEEP OR SLEPT IN A SHELTER (INCLUDING NOW)?: NO

## 2023-12-14 SDOH — ECONOMIC STABILITY: FOOD INSECURITY: WITHIN THE PAST 12 MONTHS, THE FOOD YOU BOUGHT JUST DIDN'T LAST AND YOU DIDN'T HAVE MONEY TO GET MORE.: NEVER TRUE

## 2023-12-14 ASSESSMENT — PATIENT HEALTH QUESTIONNAIRE - PHQ9
SUM OF ALL RESPONSES TO PHQ QUESTIONS 1-9: 0
1. LITTLE INTEREST OR PLEASURE IN DOING THINGS: 0
SUM OF ALL RESPONSES TO PHQ9 QUESTIONS 1 & 2: 0
SUM OF ALL RESPONSES TO PHQ QUESTIONS 1-9: 0
2. FEELING DOWN, DEPRESSED OR HOPELESS: 0
SUM OF ALL RESPONSES TO PHQ QUESTIONS 1-9: 0
SUM OF ALL RESPONSES TO PHQ QUESTIONS 1-9: 0

## 2023-12-14 NOTE — PROGRESS NOTES
Jadon Zurita is a 77 y.o. male , id x 2(name and ). Reviewed record, history, and  medications. Chief Complaint   Patient presents with    Hypertension     Patient c/o increase BP, patient has not been seen by Dr. Sven Becerril since . Patient will be having left knee surgery on 2024. Vitals:    23 1508   Weight: 87.1 kg (192 lb)   Height: 1.803 m (5' 11\")       Coordination of Care Questionnaire:   1. Have you been to the ER, urgent care clinic since your last visit? Hospitalized since your last visit? No    2. Have you seen or consulted any other health care providers outside of the [address] [place] [address] since your last visit? Include any pap smears or colon screening. Yes Ortho. ENT          2023     3:13 PM   PHQ-9    Little interest or pleasure in doing things 0   Feeling down, depressed, or hopeless 0   PHQ-2 Score 0   PHQ-9 Total Score 0            No data to display                Social Determinants of Health     Tobacco Use: Low Risk  (2023)    Patient History     Smoking Tobacco Use: Never     Smokeless Tobacco Use: Never     Passive Exposure: Not on file   Alcohol Use: Not on file   Financial Resource Strain: Low Risk  (2023)    Overall Financial Resource Strain (CARDIA)     Difficulty of Paying Living Expenses: Not hard at all   Food Insecurity: No Food Insecurity (2023)    Hunger Vital Sign     Worried About Running Out of Food in the Last Year: Never true     [address] in the Last Year: Never true   Transportation Needs: Unknown (2023)    PRAPARE - Transportation     Lack of Transportation (Medical): Not on file     Lack of Transportation (Non-Medical):  No   Physical Activity: Not on file   Stress: Not on file   Social Connections: Not on file   Intimate Partner Violence: Not on file   Depression: Not at risk (2023)    PHQ-2     PHQ-2 Score: 0   Housing Stability: Unknown (2023)    Housing Stability Vital Sign     Unable

## 2024-01-04 ENCOUNTER — OFFICE VISIT (OUTPATIENT)
Age: 79
End: 2024-01-04
Payer: MEDICARE

## 2024-01-04 VITALS
TEMPERATURE: 97.6 F | RESPIRATION RATE: 20 BRPM | OXYGEN SATURATION: 98 % | HEIGHT: 71 IN | HEART RATE: 78 BPM | SYSTOLIC BLOOD PRESSURE: 132 MMHG | BODY MASS INDEX: 27.1 KG/M2 | WEIGHT: 193.6 LBS | DIASTOLIC BLOOD PRESSURE: 82 MMHG

## 2024-01-04 DIAGNOSIS — N18.30 STAGE 3 CHRONIC KIDNEY DISEASE, UNSPECIFIED WHETHER STAGE 3A OR 3B CKD (HCC): ICD-10-CM

## 2024-01-04 DIAGNOSIS — I10 ESSENTIAL (PRIMARY) HYPERTENSION: Primary | ICD-10-CM

## 2024-01-04 PROCEDURE — 3079F DIAST BP 80-89 MM HG: CPT | Performed by: PHYSICIAN ASSISTANT

## 2024-01-04 PROCEDURE — 99213 OFFICE O/P EST LOW 20 MIN: CPT | Performed by: PHYSICIAN ASSISTANT

## 2024-01-04 PROCEDURE — G8484 FLU IMMUNIZE NO ADMIN: HCPCS | Performed by: PHYSICIAN ASSISTANT

## 2024-01-04 PROCEDURE — G8427 DOCREV CUR MEDS BY ELIG CLIN: HCPCS | Performed by: PHYSICIAN ASSISTANT

## 2024-01-04 PROCEDURE — G8419 CALC BMI OUT NRM PARAM NOF/U: HCPCS | Performed by: PHYSICIAN ASSISTANT

## 2024-01-04 PROCEDURE — 1123F ACP DISCUSS/DSCN MKR DOCD: CPT | Performed by: PHYSICIAN ASSISTANT

## 2024-01-04 PROCEDURE — 3075F SYST BP GE 130 - 139MM HG: CPT | Performed by: PHYSICIAN ASSISTANT

## 2024-01-04 PROCEDURE — 1036F TOBACCO NON-USER: CPT | Performed by: PHYSICIAN ASSISTANT

## 2024-01-04 ASSESSMENT — PATIENT HEALTH QUESTIONNAIRE - PHQ9
SUM OF ALL RESPONSES TO PHQ QUESTIONS 1-9: 0
SUM OF ALL RESPONSES TO PHQ9 QUESTIONS 1 & 2: 0
2. FEELING DOWN, DEPRESSED OR HOPELESS: 0
SUM OF ALL RESPONSES TO PHQ QUESTIONS 1-9: 0
SUM OF ALL RESPONSES TO PHQ QUESTIONS 1-9: 0
1. LITTLE INTEREST OR PLEASURE IN DOING THINGS: 0
SUM OF ALL RESPONSES TO PHQ QUESTIONS 1-9: 0

## 2024-01-04 NOTE — PROGRESS NOTES
Keven Michele is a 78 y.o. male , id x 2(name and ). Reviewed record, history, and  medications.    Chief Complaint   Patient presents with    Hypertension     Patient here for BP check, patient taking at home, runs about 140's.       Vitals:    24 1446 24 1506   BP: (!) 153/86 132/82   Site: Left Upper Arm    Position: Sitting    Cuff Size: Large Adult    Pulse: 78    Resp: 20    Temp: 97.6 °F (36.4 °C)    TempSrc: Oral    SpO2: 98%    Weight: 87.8 kg (193 lb 9.6 oz)    Height: 1.803 m (5' 11\")        Coordination of Care Questionnaire:   1. Have you been to the ER, urgent care clinic since your last visit?  Hospitalized since your last visit?No    2. Have you seen or consulted any other health care providers outside of the [facility] since your last visit?  Include any pap smears or colon screening. No          2024     2:48 PM   PHQ-9    Little interest or pleasure in doing things 0   Feeling down, depressed, or hopeless 0   PHQ-2 Score 0   PHQ-9 Total Score 0            No data to display                  Social Determinants of Health     Tobacco Use: Low Risk  (2024)    Patient History     Smoking Tobacco Use: Never     Smokeless Tobacco Use: Never     Passive Exposure: Not on file   Alcohol Use: Not on file   Financial Resource Strain: Low Risk  (2023)    Overall Financial Resource Strain (CARDIA)     Difficulty of Paying Living Expenses: Not hard at all   Food Insecurity: No Food Insecurity (2023)    Hunger Vital Sign     Worried About Running Out of Food in the Last Year: Never true     Ran Out of Food in the Last Year: Never true   Transportation Needs: Unknown (2023)    PRAPARE - Transportation     Lack of Transportation (Medical): Not on file     Lack of Transportation (Non-Medical): No   Physical Activity: Not on file   Stress: Not on file   Social Connections: Not on file   Intimate Partner Violence: Not on file   Depression: Not at risk

## 2024-01-04 NOTE — PROGRESS NOTES
Keven Micheel is a 78 y.o. male , id x 2(name and ). Reviewed record, history, and  medications.    Chief Complaint   Patient presents with    Hypertension     Patient here for BP check, patient taking at home, runs about 140's.       Vitals:    24 1446   Weight: 87.8 kg (193 lb 9.6 oz)   Height: 1.803 m (5' 11\")       Coordination of Care Questionnaire:   1. Have you been to the ER, urgent care clinic since your last visit?  Hospitalized since your last visit?No    2. Have you seen or consulted any other health care providers outside of the [facility] since your last visit?  Include any pap smears or colon screening. No          2024     2:48 PM   PHQ-9    Little interest or pleasure in doing things 0   Feeling down, depressed, or hopeless 0   PHQ-2 Score 0   PHQ-9 Total Score 0            No data to display                Social Determinants of Health     Tobacco Use: Low Risk  (2023)    Patient History     Smoking Tobacco Use: Never     Smokeless Tobacco Use: Never     Passive Exposure: Not on file   Alcohol Use: Not on file   Financial Resource Strain: Low Risk  (2023)    Overall Financial Resource Strain (CARDIA)     Difficulty of Paying Living Expenses: Not hard at all   Food Insecurity: No Food Insecurity (2023)    Hunger Vital Sign     Worried About Running Out of Food in the Last Year: Never true     Ran Out of Food in the Last Year: Never true   Transportation Needs: Unknown (2023)    PRAPARE - Transportation     Lack of Transportation (Medical): Not on file     Lack of Transportation (Non-Medical): No   Physical Activity: Not on file   Stress: Not on file   Social Connections: Not on file   Intimate Partner Violence: Not on file   Depression: Not at risk (2023)    PHQ-2     PHQ-2 Score: 0   Housing Stability: Unknown (2023)    Housing Stability Vital Sign     Unable to Pay for Housing in the Last Year: Not on file     Number of Places Lived

## 2024-01-08 ENCOUNTER — ANESTHESIA EVENT (OUTPATIENT)
Facility: HOSPITAL | Age: 79
End: 2024-01-08
Payer: MEDICARE

## 2024-01-08 RX ORDER — OXYCODONE HYDROCHLORIDE 5 MG/1
10 TABLET ORAL EVERY 4 HOURS PRN
Status: CANCELLED | OUTPATIENT
Start: 2024-01-08

## 2024-01-08 RX ORDER — ASPIRIN 81 MG/1
81 TABLET ORAL 2 TIMES DAILY
Status: CANCELLED | OUTPATIENT
Start: 2024-01-08

## 2024-01-08 RX ORDER — SODIUM CHLORIDE 0.9 % (FLUSH) 0.9 %
5-40 SYRINGE (ML) INJECTION EVERY 12 HOURS SCHEDULED
Status: CANCELLED | OUTPATIENT
Start: 2024-01-08

## 2024-01-08 RX ORDER — SODIUM CHLORIDE, SODIUM LACTATE, POTASSIUM CHLORIDE, CALCIUM CHLORIDE 600; 310; 30; 20 MG/100ML; MG/100ML; MG/100ML; MG/100ML
INJECTION, SOLUTION INTRAVENOUS CONTINUOUS
Status: CANCELLED | OUTPATIENT
Start: 2024-01-08

## 2024-01-08 RX ORDER — ACETAMINOPHEN 500 MG
1000 TABLET ORAL EVERY 8 HOURS SCHEDULED
Status: CANCELLED | OUTPATIENT
Start: 2024-01-08

## 2024-01-08 RX ORDER — HYDROXYZINE HYDROCHLORIDE 10 MG/1
10 TABLET, FILM COATED ORAL EVERY 8 HOURS PRN
Status: CANCELLED | OUTPATIENT
Start: 2024-01-08

## 2024-01-08 RX ORDER — POLYETHYLENE GLYCOL 3350 17 G/17G
17 POWDER, FOR SOLUTION ORAL DAILY
Status: CANCELLED | OUTPATIENT
Start: 2024-01-08

## 2024-01-08 RX ORDER — OXYCODONE HYDROCHLORIDE 5 MG/1
5 TABLET ORAL EVERY 4 HOURS PRN
Status: CANCELLED | OUTPATIENT
Start: 2024-01-08

## 2024-01-08 RX ORDER — MAGNESIUM HYDROXIDE/ALUMINUM HYDROXICE/SIMETHICONE 120; 1200; 1200 MG/30ML; MG/30ML; MG/30ML
15 SUSPENSION ORAL EVERY 6 HOURS PRN
Status: CANCELLED | OUTPATIENT
Start: 2024-01-08

## 2024-01-08 RX ORDER — PROMETHAZINE HYDROCHLORIDE 25 MG/1
12.5 TABLET ORAL EVERY 6 HOURS PRN
Status: CANCELLED | OUTPATIENT
Start: 2024-01-08

## 2024-01-08 RX ORDER — ONDANSETRON 2 MG/ML
4 INJECTION INTRAMUSCULAR; INTRAVENOUS EVERY 6 HOURS PRN
Status: CANCELLED | OUTPATIENT
Start: 2024-01-08

## 2024-01-08 RX ORDER — MELOXICAM 7.5 MG/1
3.75 TABLET ORAL DAILY
Status: CANCELLED | OUTPATIENT
Start: 2024-01-08 | End: 2024-01-11

## 2024-01-08 RX ORDER — SODIUM CHLORIDE 0.9 % (FLUSH) 0.9 %
5-40 SYRINGE (ML) INJECTION PRN
Status: CANCELLED | OUTPATIENT
Start: 2024-01-08

## 2024-01-08 RX ORDER — SODIUM CHLORIDE 9 MG/ML
INJECTION, SOLUTION INTRAVENOUS PRN
Status: CANCELLED | OUTPATIENT
Start: 2024-01-08

## 2024-01-08 RX ORDER — ALLOPURINOL 100 MG/1
100 TABLET ORAL DAILY
Status: CANCELLED | OUTPATIENT
Start: 2024-01-08

## 2024-01-08 NOTE — DISCHARGE INSTRUCTIONS
questions about a medical condition or this instruction, always ask your healthcare professional. [employer] disclaims any warranty or liability for your use of this information.    AT THE COMPLETION OF DISCHARGE INSTRUCTION REVIEW, WE VERIFY:  The discharge information has been reviewed with the patient and caregiver.    Questions have been asked and answered meeting patient and caregiver expectations.  The patient and caregiver verbalized understanding.    Your discharge nurse was Ladarius MALIN, RN-BC       Board Certified - Pain Management      CONTENTS FOUND IN YOUR DISCHARGE ENVELOPE:  [x]     Surgeon and Hospital Discharge Instructions  []     [facility] Care Provider Card  [x]     Medication & Side Effect Guide            (your newly prescribed medications have been marked/highlighted showing the most common side effects from the classes of drugs on your prescriptions)  [x]     Medication Prescription(s)     x 5         ( [x] These have been sent electronically to your pharmacy by your surgeon,   - OR -                       your surgeon has already provided these to you during a previous/pre-op office visit)  [x]     Pain block and/or block with On-Q Catheter from Anesthesia Service (information included in your instructions above)         []    [facility] Education Information  []     Physical Therapy Prescription  []     Follow-up Appointment Cards  []     Surgery-related Pictures/Media  [x]     Medical device information sheets/pamphlets from their    []     School/work excuse note.  []     /parent work excuse note.      The following personal items collected during your admission for safe keeping are returned to you:     Dental Appliance:    Vision:    Hearing Aid:    Jewelry:    Clothing:    Other Valuables:    Valuables sent to safe: Dose (mL/hr) Propofol : 0 mL/hr

## 2024-01-09 ENCOUNTER — HOSPITAL ENCOUNTER (OUTPATIENT)
Facility: HOSPITAL | Age: 79
Setting detail: OUTPATIENT SURGERY
Discharge: HOME | End: 2024-01-09
Attending: ORTHOPAEDIC SURGERY | Admitting: ORTHOPAEDIC SURGERY
Payer: MEDICARE

## 2024-01-09 ENCOUNTER — APPOINTMENT (OUTPATIENT)
Facility: HOSPITAL | Age: 79
End: 2024-01-09
Attending: ORTHOPAEDIC SURGERY
Payer: MEDICARE

## 2024-01-09 ENCOUNTER — ANESTHESIA (OUTPATIENT)
Facility: HOSPITAL | Age: 79
End: 2024-01-09
Payer: MEDICARE

## 2024-01-09 VITALS
WEIGHT: 189.15 LBS | SYSTOLIC BLOOD PRESSURE: 121 MMHG | HEART RATE: 81 BPM | BODY MASS INDEX: 26.48 KG/M2 | RESPIRATION RATE: 16 BRPM | DIASTOLIC BLOOD PRESSURE: 82 MMHG | OXYGEN SATURATION: 95 % | TEMPERATURE: 97.8 F | HEIGHT: 71 IN

## 2024-01-09 DIAGNOSIS — M17.12 PRIMARY OSTEOARTHRITIS OF LEFT KNEE: Primary | ICD-10-CM

## 2024-01-09 PROBLEM — Z96.652 S/P TOTAL KNEE ARTHROPLASTY, LEFT: Status: ACTIVE | Noted: 2024-01-09

## 2024-01-09 PROCEDURE — C1776 JOINT DEVICE (IMPLANTABLE): HCPCS | Performed by: ORTHOPAEDIC SURGERY

## 2024-01-09 PROCEDURE — 3600000005 HC SURGERY LEVEL 5 BASE: Performed by: ORTHOPAEDIC SURGERY

## 2024-01-09 PROCEDURE — 7100000001 HC PACU RECOVERY - ADDTL 15 MIN: Performed by: ORTHOPAEDIC SURGERY

## 2024-01-09 PROCEDURE — 6360000002 HC RX W HCPCS: Performed by: ANESTHESIOLOGY

## 2024-01-09 PROCEDURE — 2709999900 HC NON-CHARGEABLE SUPPLY: Performed by: ORTHOPAEDIC SURGERY

## 2024-01-09 PROCEDURE — 2500000003 HC RX 250 WO HCPCS: Performed by: NURSE ANESTHETIST, CERTIFIED REGISTERED

## 2024-01-09 PROCEDURE — APPNB30 APP NON BILLABLE TIME 0-30 MINS: Performed by: NURSE PRACTITIONER

## 2024-01-09 PROCEDURE — 6370000000 HC RX 637 (ALT 250 FOR IP): Performed by: ANESTHESIOLOGY

## 2024-01-09 PROCEDURE — 7100000010 HC PHASE II RECOVERY - FIRST 15 MIN: Performed by: ORTHOPAEDIC SURGERY

## 2024-01-09 PROCEDURE — 7100000000 HC PACU RECOVERY - FIRST 15 MIN: Performed by: ORTHOPAEDIC SURGERY

## 2024-01-09 PROCEDURE — 7100000011 HC PHASE II RECOVERY - ADDTL 15 MIN: Performed by: ORTHOPAEDIC SURGERY

## 2024-01-09 PROCEDURE — 6360000002 HC RX W HCPCS: Performed by: ORTHOPAEDIC SURGERY

## 2024-01-09 PROCEDURE — 2580000003 HC RX 258: Performed by: ORTHOPAEDIC SURGERY

## 2024-01-09 PROCEDURE — 2720000010 HC SURG SUPPLY STERILE: Performed by: ORTHOPAEDIC SURGERY

## 2024-01-09 PROCEDURE — 6370000000 HC RX 637 (ALT 250 FOR IP): Performed by: ORTHOPAEDIC SURGERY

## 2024-01-09 PROCEDURE — 3700000000 HC ANESTHESIA ATTENDED CARE: Performed by: ORTHOPAEDIC SURGERY

## 2024-01-09 PROCEDURE — 73560 X-RAY EXAM OF KNEE 1 OR 2: CPT

## 2024-01-09 PROCEDURE — 2580000003 HC RX 258: Performed by: ANESTHESIOLOGY

## 2024-01-09 PROCEDURE — 6360000002 HC RX W HCPCS: Performed by: NURSE ANESTHETIST, CERTIFIED REGISTERED

## 2024-01-09 PROCEDURE — 97530 THERAPEUTIC ACTIVITIES: CPT

## 2024-01-09 PROCEDURE — 64447 NJX AA&/STRD FEMORAL NRV IMG: CPT | Performed by: ANESTHESIOLOGY

## 2024-01-09 PROCEDURE — 97116 GAIT TRAINING THERAPY: CPT

## 2024-01-09 PROCEDURE — C1713 ANCHOR/SCREW BN/BN,TIS/BN: HCPCS | Performed by: ORTHOPAEDIC SURGERY

## 2024-01-09 PROCEDURE — 3700000001 HC ADD 15 MINUTES (ANESTHESIA): Performed by: ORTHOPAEDIC SURGERY

## 2024-01-09 PROCEDURE — 3600000015 HC SURGERY LEVEL 5 ADDTL 15MIN: Performed by: ORTHOPAEDIC SURGERY

## 2024-01-09 PROCEDURE — 97161 PT EVAL LOW COMPLEX 20 MIN: CPT

## 2024-01-09 DEVICE — ATTUNE KNEE SYSTEM TIBIAL INSERT FIXED BEARING MEDIAL STABILIZED LEFT AOX 6, 5MM
Type: IMPLANTABLE DEVICE | Site: KNEE | Status: FUNCTIONAL
Brand: ATTUNE

## 2024-01-09 DEVICE — ATTUNE KNEE SYSTEM TIBIAL BASE FIXED BEARING SIZE 5 CEMENTED
Type: IMPLANTABLE DEVICE | Site: KNEE | Status: FUNCTIONAL
Brand: ATTUNE

## 2024-01-09 DEVICE — CEMENT BNE 40GM FULL DOSE PMMA W/O ANTIBIO M VISC RADPQ: Type: IMPLANTABLE DEVICE | Site: KNEE | Status: FUNCTIONAL

## 2024-01-09 DEVICE — ATTUNE KNEE SYSTEM FEMORAL CRUCIATE RETAINING SIZE 6 LEFT CEMENTED
Type: IMPLANTABLE DEVICE | Site: KNEE | Status: FUNCTIONAL
Brand: ATTUNE

## 2024-01-09 DEVICE — KNEE K1 TOT HEMI STD CEM IMPL CAPPED SYNTHES: Type: IMPLANTABLE DEVICE | Site: KNEE | Status: FUNCTIONAL

## 2024-01-09 RX ORDER — SODIUM CHLORIDE, SODIUM LACTATE, POTASSIUM CHLORIDE, CALCIUM CHLORIDE 600; 310; 30; 20 MG/100ML; MG/100ML; MG/100ML; MG/100ML
INJECTION, SOLUTION INTRAVENOUS CONTINUOUS
Status: DISCONTINUED | OUTPATIENT
Start: 2024-01-09 | End: 2024-01-09 | Stop reason: HOSPADM

## 2024-01-09 RX ORDER — ASPIRIN 81 MG/1
81 TABLET, CHEWABLE ORAL 2 TIMES DAILY
Qty: 60 TABLET | Refills: 0 | Status: SHIPPED | OUTPATIENT
Start: 2024-01-09 | End: 2024-02-08

## 2024-01-09 RX ORDER — TRANEXAMIC ACID 100 MG/ML
INJECTION, SOLUTION INTRAVENOUS PRN
Status: DISCONTINUED | OUTPATIENT
Start: 2024-01-09 | End: 2024-01-09 | Stop reason: SDUPTHER

## 2024-01-09 RX ORDER — DIPHENHYDRAMINE HYDROCHLORIDE 50 MG/ML
12.5 INJECTION INTRAMUSCULAR; INTRAVENOUS
Status: DISCONTINUED | OUTPATIENT
Start: 2024-01-09 | End: 2024-01-09 | Stop reason: HOSPADM

## 2024-01-09 RX ORDER — ONDANSETRON 2 MG/ML
4 INJECTION INTRAMUSCULAR; INTRAVENOUS
Status: DISCONTINUED | OUTPATIENT
Start: 2024-01-09 | End: 2024-01-09 | Stop reason: HOSPADM

## 2024-01-09 RX ORDER — FAMOTIDINE 10 MG/ML
INJECTION, SOLUTION INTRAVENOUS PRN
Status: DISCONTINUED | OUTPATIENT
Start: 2024-01-09 | End: 2024-01-09 | Stop reason: SDUPTHER

## 2024-01-09 RX ORDER — EPHEDRINE SULFATE/0.9% NACL/PF 50 MG/5 ML
SYRINGE (ML) INTRAVENOUS PRN
Status: DISCONTINUED | OUTPATIENT
Start: 2024-01-09 | End: 2024-01-09 | Stop reason: SDUPTHER

## 2024-01-09 RX ORDER — DEXMEDETOMIDINE HYDROCHLORIDE 100 UG/ML
INJECTION, SOLUTION INTRAVENOUS PRN
Status: DISCONTINUED | OUTPATIENT
Start: 2024-01-09 | End: 2024-01-09 | Stop reason: SDUPTHER

## 2024-01-09 RX ORDER — CEPHALEXIN 500 MG/1
500 CAPSULE ORAL 4 TIMES DAILY
Qty: 12 CAPSULE | Refills: 0 | Status: SHIPPED | OUTPATIENT
Start: 2024-01-09 | End: 2024-01-12

## 2024-01-09 RX ORDER — MIDAZOLAM HYDROCHLORIDE 2 MG/2ML
2 INJECTION, SOLUTION INTRAMUSCULAR; INTRAVENOUS
Status: DISCONTINUED | OUTPATIENT
Start: 2024-01-09 | End: 2024-01-09 | Stop reason: HOSPADM

## 2024-01-09 RX ORDER — FENTANYL CITRATE 50 UG/ML
25 INJECTION, SOLUTION INTRAMUSCULAR; INTRAVENOUS EVERY 5 MIN PRN
Status: DISCONTINUED | OUTPATIENT
Start: 2024-01-09 | End: 2024-01-09 | Stop reason: HOSPADM

## 2024-01-09 RX ORDER — FENTANYL CITRATE 50 UG/ML
INJECTION, SOLUTION INTRAMUSCULAR; INTRAVENOUS PRN
Status: DISCONTINUED | OUTPATIENT
Start: 2024-01-09 | End: 2024-01-09 | Stop reason: SDUPTHER

## 2024-01-09 RX ORDER — FENTANYL CITRATE 50 UG/ML
100 INJECTION, SOLUTION INTRAMUSCULAR; INTRAVENOUS
Status: DISCONTINUED | OUTPATIENT
Start: 2024-01-09 | End: 2024-01-09 | Stop reason: HOSPADM

## 2024-01-09 RX ORDER — PHENYLEPHRINE HCL IN 0.9% NACL 0.4MG/10ML
SYRINGE (ML) INTRAVENOUS PRN
Status: DISCONTINUED | OUTPATIENT
Start: 2024-01-09 | End: 2024-01-09 | Stop reason: SDUPTHER

## 2024-01-09 RX ORDER — POLYETHYLENE GLYCOL 3350 17 G/17G
17 POWDER, FOR SOLUTION ORAL DAILY
Qty: 7 EACH | Refills: 0 | Status: SHIPPED | OUTPATIENT
Start: 2024-01-09 | End: 2024-01-16

## 2024-01-09 RX ORDER — BUPIVACAINE HYDROCHLORIDE 2.5 MG/ML
INJECTION, SOLUTION EPIDURAL; INFILTRATION; INTRACAUDAL PRN
Status: DISCONTINUED | OUTPATIENT
Start: 2024-01-09 | End: 2024-01-09 | Stop reason: SDUPTHER

## 2024-01-09 RX ORDER — LIDOCAINE HYDROCHLORIDE 10 MG/ML
1 INJECTION, SOLUTION EPIDURAL; INFILTRATION; INTRACAUDAL; PERINEURAL
Status: DISCONTINUED | OUTPATIENT
Start: 2024-01-09 | End: 2024-01-09 | Stop reason: HOSPADM

## 2024-01-09 RX ORDER — ACETAMINOPHEN 500 MG
500 TABLET ORAL 3 TIMES DAILY
Qty: 30 TABLET | Refills: 0 | Status: SHIPPED | OUTPATIENT
Start: 2024-01-09 | End: 2024-01-19

## 2024-01-09 RX ORDER — PROPOFOL 10 MG/ML
INJECTION, EMULSION INTRAVENOUS CONTINUOUS PRN
Status: DISCONTINUED | OUTPATIENT
Start: 2024-01-09 | End: 2024-01-09 | Stop reason: SDUPTHER

## 2024-01-09 RX ORDER — ACETAMINOPHEN 325 MG/1
975 TABLET ORAL ONCE
Status: COMPLETED | OUTPATIENT
Start: 2024-01-09 | End: 2024-01-09

## 2024-01-09 RX ORDER — OXYCODONE HYDROCHLORIDE 5 MG/1
5 TABLET ORAL
Status: COMPLETED | OUTPATIENT
Start: 2024-01-09 | End: 2024-01-09

## 2024-01-09 RX ORDER — OXYCODONE HYDROCHLORIDE 5 MG/1
5-10 TABLET ORAL EVERY 4 HOURS PRN
Qty: 42 TABLET | Refills: 0 | Status: SHIPPED | OUTPATIENT
Start: 2024-01-09 | End: 2024-01-19

## 2024-01-09 RX ORDER — ONDANSETRON 2 MG/ML
INJECTION INTRAMUSCULAR; INTRAVENOUS PRN
Status: DISCONTINUED | OUTPATIENT
Start: 2024-01-09 | End: 2024-01-09 | Stop reason: SDUPTHER

## 2024-01-09 RX ADMIN — MEPIVACAINE HYDROCHLORIDE 3.3 ML: 15 INJECTION, SOLUTION EPIDURAL; INFILTRATION at 07:23

## 2024-01-09 RX ADMIN — FAMOTIDINE 20 MG: 10 INJECTION INTRAVENOUS at 07:27

## 2024-01-09 RX ADMIN — ONDANSETRON HYDROCHLORIDE 4 MG: 2 SOLUTION INTRAMUSCULAR; INTRAVENOUS at 07:25

## 2024-01-09 RX ADMIN — HYDROMORPHONE HYDROCHLORIDE 0.5 MG: 1 INJECTION, SOLUTION INTRAMUSCULAR; INTRAVENOUS; SUBCUTANEOUS at 10:36

## 2024-01-09 RX ADMIN — BUPIVACAINE HYDROCHLORIDE 20 ML: 2.5 INJECTION, SOLUTION EPIDURAL; INFILTRATION; INTRACAUDAL; PERINEURAL at 07:00

## 2024-01-09 RX ADMIN — Medication 40 MCG: at 07:54

## 2024-01-09 RX ADMIN — DEXMEDETOMIDINE 4 MCG: 100 INJECTION, SOLUTION INTRAVENOUS at 07:33

## 2024-01-09 RX ADMIN — OXYCODONE HYDROCHLORIDE 5 MG: 5 TABLET ORAL at 10:22

## 2024-01-09 RX ADMIN — Medication 40 MCG: at 07:50

## 2024-01-09 RX ADMIN — BUPIVACAINE HYDROCHLORIDE 20 ML: 2.5 INJECTION, SOLUTION EPIDURAL; INFILTRATION; INTRACAUDAL; PERINEURAL at 07:03

## 2024-01-09 RX ADMIN — HYDROMORPHONE HYDROCHLORIDE 0.5 MG: 1 INJECTION, SOLUTION INTRAMUSCULAR; INTRAVENOUS; SUBCUTANEOUS at 10:53

## 2024-01-09 RX ADMIN — WATER 2000 MG: 1 INJECTION INTRAMUSCULAR; INTRAVENOUS; SUBCUTANEOUS at 07:32

## 2024-01-09 RX ADMIN — Medication 40 MCG: at 07:46

## 2024-01-09 RX ADMIN — ACETAMINOPHEN 975 MG: 325 TABLET ORAL at 05:59

## 2024-01-09 RX ADMIN — PROPOFOL 100 MCG/KG/MIN: 10 INJECTION, EMULSION INTRAVENOUS at 07:31

## 2024-01-09 RX ADMIN — FENTANYL CITRATE 100 MCG: 50 INJECTION, SOLUTION INTRAMUSCULAR; INTRAVENOUS at 06:57

## 2024-01-09 RX ADMIN — Medication 10 MG: at 07:54

## 2024-01-09 RX ADMIN — SODIUM CHLORIDE, POTASSIUM CHLORIDE, SODIUM LACTATE AND CALCIUM CHLORIDE: 600; 310; 30; 20 INJECTION, SOLUTION INTRAVENOUS at 08:35

## 2024-01-09 RX ADMIN — SODIUM CHLORIDE, POTASSIUM CHLORIDE, SODIUM LACTATE AND CALCIUM CHLORIDE: 600; 310; 30; 20 INJECTION, SOLUTION INTRAVENOUS at 05:58

## 2024-01-09 RX ADMIN — TRANEXAMIC ACID 1000 MG: 100 INJECTION, SOLUTION INTRAVENOUS at 07:37

## 2024-01-09 RX ADMIN — DEXMEDETOMIDINE 4 MCG: 100 INJECTION, SOLUTION INTRAVENOUS at 07:31

## 2024-01-09 ASSESSMENT — PAIN SCALES - GENERAL
PAINLEVEL_OUTOF10: 2
PAINLEVEL_OUTOF10: 3
PAINLEVEL_OUTOF10: 4
PAINLEVEL_OUTOF10: 0
PAINLEVEL_OUTOF10: 4
PAINLEVEL_OUTOF10: 0
PAINLEVEL_OUTOF10: 1
PAINLEVEL_OUTOF10: 0
PAINLEVEL_OUTOF10: 1

## 2024-01-09 ASSESSMENT — PAIN - FUNCTIONAL ASSESSMENT
PAIN_FUNCTIONAL_ASSESSMENT: ACTIVITIES ARE NOT PREVENTED
PAIN_FUNCTIONAL_ASSESSMENT: 0-10

## 2024-01-09 ASSESSMENT — PAIN DESCRIPTION - ORIENTATION: ORIENTATION: LEFT;INNER

## 2024-01-09 ASSESSMENT — PAIN DESCRIPTION - ONSET: ONSET: ON-GOING

## 2024-01-09 ASSESSMENT — PAIN DESCRIPTION - PAIN TYPE: TYPE: SURGICAL PAIN;ACUTE PAIN

## 2024-01-09 ASSESSMENT — PAIN DESCRIPTION - DESCRIPTORS: DESCRIPTORS: DULL;ACHING;SORE

## 2024-01-09 ASSESSMENT — PAIN DESCRIPTION - FREQUENCY: FREQUENCY: INTERMITTENT

## 2024-01-09 ASSESSMENT — PAIN DESCRIPTION - LOCATION: LOCATION: KNEE

## 2024-01-09 NOTE — ANESTHESIA PROCEDURE NOTES
Peripheral Block    Patient location during procedure: pre-op  Reason for block: procedure for pain, post-op pain management, primary anesthetic and at surgeon's request  Start time: 1/9/2024 6:54 AM  End time: 1/9/2024 7:03 AM  Staffing  Performed: anesthesiologist   Anesthesiologist: Alex Roy MD  Performed by: Alex Roy MD  Authorized by: Alex Roy MD    Preanesthetic Checklist  Completed: patient identified, IV checked, site marked, risks and benefits discussed, surgical/procedural consents, timeout performed, anesthesia consent given, oxygen available and monitors applied/VS acknowledged  Peripheral Block   Patient position: supine  Prep: ChloraPrep  Provider prep: mask and sterile gloves  Patient monitoring: cardiac monitor, continuous pulse ox, continuous capnometry, frequent blood pressure checks, IV access, oxygen and responsive to questions  Block type: iPacks and Saphenous  Laterality: left  Injection technique: single-shot  Guidance: ultrasound guided    Needle   Needle type: Other   Needle gauge: 21 G  Needle localization: ultrasound guidance  Needle length: 10 cmOther needle type: STIMUPLEX  Assessment   Injection assessment: negative aspiration for heme, no paresthesia on injection, local visualized surrounding nerve on ultrasound and no intravascular symptoms  Hemodynamics: stable  Outcomes: patient tolerated procedure well    Additional Notes  IPACK block performed with landmark identification. Needle used was 4\" stimuplex 21g 20cc 0.25% bupivacaine injected intermittently with negative aspiration.     Francesca LUO witnessed timeout and block written on correct side.

## 2024-01-09 NOTE — PERIOP NOTE
PACU-IN REPORT FROM ANESTHESIA    Verbal report received from   Selina   [] MD/-Anesthesiologist    [x] CRNA   [] with student    CHOICE ANESTHESIA:  [] GENERAL  [] TIVA  [x] MAC  [] LOCAL  [x] REGIONAL  [x] SPINAL   [] EPIDURAL   **Note the anesthesia record for medications given intraoperatively.**           [] E.R.A.S. PROTOCOL    SURGICAL PROCEDURE: Procedure(s) (LRB):  LEFT TOTAL KNEE REPLACEMENT (SPINAL ANESTHESIA WITH REGIONAL BLOCK) (FAST TRACK) (Left)     SURGEON: Ramesh Hogue MD.    Brief Initial Visual Assessment:    Patient Age: [] Infant(1-12mo)       []Pediatric(1-13yrs)    [] Adolescent(13-18yrs)     [] Adult(18-65yrs)      [x]Geriatric Adult(>65yrs).                   Patient    [x] Alert           [x]Calm & Cooperative      [] Anxious/Restless      [] Combative  Appearance:  [x] Drowsy      [] Confused/Disoriented     [] Sedated      [] Unresponsive     Oriented x  3            Airway:     [x] Patent          [] \"Difficult Airway\" report by Anesthesia                        [] Obstructs easily/Obstructed on arrival          [] Manual stimulation and/or airway assistance necessary                         [] Airway improved with head/airway repositioning                       Airway Adjuncts Present: [] Oral Airway    [] Nasal Trumpet    [] ETT    [] LMA            Respiratory  [x] Even   [] Labored   [] Shallow   [] Tachypnea (>28 RR/min)  [] Bradypnea (<10 RR/min)  Pattern:    [x] Non-Labored  [] VENT and/or respiratory assistance     being provided.        Skin:     [x] Pink [] Dusky    [] Pale         [x] Warm     [] Hot [] Cool       [] Cold    [x]Dry     [] Moist [] Diaphoretic     Membranes:  [x] Pink    [] Pale        [x] Moist [] Dry     [] Crusty     Pain:   [x] No Acute Discomfort.    0  /10 Scale [x] Verbal Numeric / Visual   [] Moderate Discomfort.      [] V.A.S.    [] Acute Discomfort.                 [] A.N.V.    [] Chronic-Issue Related Discomfort.   [] F.L.A.C.C.   Note E-MAR for

## 2024-01-09 NOTE — ANESTHESIA PROCEDURE NOTES
Spinal Block    Patient location during procedure: pre-op  End time: 1/9/2024 7:23 AM  Reason for block: post-op pain management, primary anesthetic and at surgeon's request  Staffing  Performed: resident/CRNA   Resident/CRNA: Selina Pace APRN - CRNA  Performed by: Selina Pace APRN - CRNA  Authorized by: Alex Roy MD    Spinal Block  Patient position: sitting  Prep: DuraPrep  Patient monitoring: cardiac monitor, continuous pulse ox, continuous capnometry, frequent blood pressure checks and oxygen  Approach: midline  Location: L3/L4  Provider prep: mask and sterile gloves  Needle  Needle type: Pencan   Needle gauge: 25 G  Needle length: 3.5 in  Assessment  Swirl obtained: Yes  CSF: clear  Attempts: 1  Hemodynamics: stable  Preanesthetic Checklist  Completed: patient identified, IV checked, site marked, risks and benefits discussed, surgical/procedural consents, pre-op evaluation, timeout performed, anesthesia consent given, oxygen available and monitors applied/VS acknowledged

## 2024-01-09 NOTE — PLAN OF CARE
Problem: Physical Therapy - Adult  Goal: By Discharge: Performs mobility at highest level of function for planned discharge setting.  See evaluation for individualized goals.  Description: FUNCTIONAL STATUS PRIOR TO ADMISSION: Patient was independent and active without use of DME.    HOME SUPPORT PRIOR TO ADMISSION: The patient lived with his supportive wife but did not require assistance.    Physical Therapy Goals  Initiated 1/9/2024  1.  Patient will move from supine to sit and sit to supine in bed with independence within 4 day(s).    2.  Patient will perform sit to stand with independence within 4 day(s).  3.  Patient will transfer from bed to chair and chair to bed with modified independence using the least restrictive device within 4 day(s).  4.  Patient will ambulate with modified independence for 150 feet with the least restrictive device within 4 day(s).   5.  Patient will ascend/descend 2 stairs with 1 handrail(s) with modified independence within 4 day(s).  6. Patient will perform TKA home exercise program per protocol with independence within 4 days.  7. Patient will demonstrate AROM 0-90 degrees in operative joint within 4 days.      1/9/2024 1137 by Jef Rolle, PT  Outcome: Progressing    PHYSICAL THERAPY EVALUATION    Patient: Keven Michele (78 y.o. male)  Date: 1/9/2024  Primary Diagnosis: Primary osteoarthritis of left knee [M17.12]  Procedure(s) (LRB):  LEFT TOTAL KNEE REPLACEMENT (SPINAL ANESTHESIA WITH REGIONAL BLOCK) (FAST TRACK) (Left) Day of Surgery   Precautions:                      ASSESSMENT :   DEFICITS/IMPAIRMENTS:   The patient is limited by decreased functional mobility, ROM, strength, body mechanics, balance, orthostatic hypotension     Based on the impairments listed above, the patient presents with anticipated impairments following admission for a left TKA. His pain was controlled, however, he was hypotensive and diaphoretic following bed level assessment and exercise. 
andrew)                                                                                                                                                                                                                                    Intervention/Education specific to: \"knee replacement\"    Education provided to avoid resting in external rotation or knee flexion while in bed. Discussed avoidance of resting with a pillow under the knee but that a pillow from calf to heel is acceptable for short periods if it supports knee extension. Encouraged use of cryo therapy to aide in pain and edema control.              Therapeutic Exercises:   Reviewed the remainder of HEP for knee extension and flexion exercises with patient verbalized understanding                                                                               Activity Tolerance:   Good  Patient Vitals for the past 6 hrs:   Pulse BP Patient Position   01/09/24 1300 -- 121/82 --   01/09/24 1215 -- (!) 161/99 --       After treatment:   Patient left in no apparent distress sitting up in chair and Call bell within reach      COMMUNICATION/EDUCATION:   The patient's plan of care was discussed with: registered nurse    Patient Education  Education Given To: Patient;Family  Education Provided: Role of Therapy;Home Exercise Program  Education Method: Demonstration;Verbal  Barriers to Learning: None  Education Outcome: Verbalized understanding      Jef Rolle, PT  Minutes: 23

## 2024-01-09 NOTE — INTERVAL H&P NOTE
Update History & Physical    The patient's History and Physical of December 13, was reviewed with the patient and I examined the patient. There was no change. The surgical site was confirmed by the patient and me.     Plan: The risks, benefits, expected outcome, and alternative to the recommended procedure have been discussed with the patient. Patient understands and wants to proceed with the procedure.     Electronically signed by Ramesh Hogue MD on 1/9/2024 at 5:59 AM

## 2024-01-09 NOTE — OP NOTE
OPERATIVE REPORT    FACILITY: [facility]    PATIENT NAME: Keven Michele     DATE OF OPERATION: 1/9/2024    PREOPERATIVE DIAGNOSIS: left knee arthritis    POSTOPERATIVE DIAGNOSIS: same    OPERATIVE PROCEDURE:   1. left total knee arthroplasty, CPT 01063    ATTENDING PHYSICIAN: Ramesh Hogue MD    ASSISTANT: JACINTA Mendez    JUSTIFICATION FOR SURGICAL ASSISTANT:   Please note that the surgical assistant listed above (JACINTA Mendez) was required and necessary for the completion of this case to assist with limb positioning, soft tissue retraction, equipment management, implant insertion, and wound closure.     IMPLANTS:    Implant Name Type Inv. Item Serial No.  Lot No. LRB No. Used Action   CEMENT BNE 40GM FULL DOSE PMMA W/O ANTIBIO M VISC RADPQ - [id number]  CEMENT BNE 40GM FULL DOSE PMMA W/O ANTIBIO M VISC RADPQ N/A [employer] [facility] [id number] Left 2 Implanted   COMPONENT FEM SZ 6 L KNEE CRUCE RET HARVINDER ATTUNE - SN/A  COMPONENT FEM SZ 6 L KNEE CRUCE RET HARVINDER ATTUNE N/A [facility] [id number] Left 1 Implanted   BASEPLATE TIB SZ 5 FIX BEAR HARVINDER S+ TECHNOLOGY ATTUNE - SN/A  BASEPLATE TIB SZ 5 FIX BEAR HARVINDER S+ TECHNOLOGY ATTUNE N/A [place] [facility] [id number] Left 1 Implanted   INSERT TIB FIX BEAR 6 5 MM LT MEDL KNEE STBL AOX ATTUNE - SN/A  INSERT TIB FIX BEAR 6 5 MM LT MEDL KNEE STBL AOX ATTUNE N/A [facility] [id number] Left 1 Implanted       SPECIMENS:  None    OPERATIVE FINDINGS: Advanced arthritis left knee    ANESTHESIA:   Spinal anesthetic, adductor canal block, and conscious sedation.    FLUIDS: Please see anesthesia record    ESTIMATED BLOOD LOSS: 150cc    TOURNIQUET TIME: not elevated    INDICATIONS FOR PROCEDURE:   Keven Michele is a 78 y.o. male who presented to clinic with left knee pain. Radiographs demonstrated advanced arthritic changes of the affected knee. They were counseled on the risks, benefits, and alternatives to

## 2024-01-09 NOTE — ANESTHESIA POSTPROCEDURE EVALUATION
Department of Anesthesiology  Postprocedure Note    Patient: Keven Michele  MRN: [id number]  YOB: 1945  Date of evaluation: 1/9/2024    Procedure Summary       Date: 01/09/24 Room / Location: [facility] ASU OR  / [facility] AMBULATORY OR    Anesthesia Start: 0725 Anesthesia Stop: 0911    Procedure: LEFT TOTAL KNEE REPLACEMENT (SPINAL ANESTHESIA WITH REGIONAL BLOCK) (FAST TRACK) (Left: Knee) Diagnosis:       Primary osteoarthritis of left knee      (Primary osteoarthritis of left knee [M17.12])    Surgeons: Ramesh Hogue MD Responsible Provider: Alex Roy MD    Anesthesia Type: Spinal, Regional, MAC ASA Status: 2            Anesthesia Type: Spinal, Regional, MAC    Venecia Phase I: Venecia Score: 10    Venecia Phase II:      Anesthesia Post Evaluation    Patient location during evaluation: PACU  Patient participation: complete - patient participated  Level of consciousness: awake  Airway patency: patent  Nausea & Vomiting: no vomiting and no nausea  Cardiovascular status: hemodynamically stable  Respiratory status: acceptable  Hydration status: stable  Multimodal analgesia pain management approach  Pain management: adequate    No notable events documented.

## 2024-01-09 NOTE — PERIOP NOTE
POST ANESTHESIA CARE    DISCHARGE / TRANSFER NOTE  Keven Michele was:    [x] discharged        via   [x] Wheelchair          to [x] Private Vehicle     [] transferred    [] Carried    [] Taxi / Vehicle \"for Hire\"  [] Walk out   [] Ambulance / Medical Transportation   [] Stretcher   [] Hospital room _**_           [] Bed      Patient was escorted by:      [x] Nurse   [] Volunteer  [] Transporter / Technician  [] Parent      [] Spouse / Family /      Patient verbalized     [x] appreciation and was very pleased with care received   [] frustration with care received       throughout their stay.    Patient was discharged in     [x] pleasant mood  [] sad mood  [] mad mood .     Pain at discharge/transfer was      0  /10.    Discharge, medication and follow-up instructions were verbalized as understood prior to discharge  (if applicable for same-day procedures being discharged.)    All personal belongings have been returned to patient, and patient/family verbally confirm receiving belongings as all present.

## 2024-01-09 NOTE — ANESTHESIA PRE PROCEDURE
2,000 mg in sterile water 20 mL IV syringe  2,000 mg IntraVENous Once Ramesh Hogue MD           Allergies:  No Known Allergies    Problem List:    Patient Active Problem List   Diagnosis Code   • Elevated LDL cholesterol level E78.00   • Advance care planning Z71.89   • Male erectile dysfunction N52.9   • Essential hypertension, benign I10   • Elevated serum creatinine R79.89   • Gout M10.9   • Primary osteoarthritis of left knee M17.12   • Chronic renal disease, stage III (HCC) [162013] N18.30       Past Medical History:        Diagnosis Date   • Arthritis     gout   • Asthma     childhood   • CKD (chronic kidney disease)    • Family history of skin cancer    • Gout 11/13/2013   • [place] (hard of hearing)     deaf in left ear, does not wear hearing aids   • Hypertension    • Personal history of radiation exposure     childhood radiation treatment for acne   • Skin cancer     basal cell under neck   • Sun-damaged skin        Past Surgical History:        Procedure Laterality Date   • COLONOSCOPY  11/20/2014        • COLONOSCOPY  2006 2015 1 polyp,    • HEENT      oral surgery   • MOHS SURGERY  05/30/2018    Recurrent BCC L chest by Dr. Song        Social History:    Social History     Tobacco Use   • Smoking status: Never   • Smokeless tobacco: Never   Substance Use Topics   • Alcohol use: Yes     Alcohol/week: 2.0 standard drinks of alcohol     Types: 2 Glasses of wine per week                                Counseling given: Not Answered      Vital Signs (Current):   Vitals:    01/09/24 0556   BP: (!) 172/83   Pulse: 68   Resp: 13   Temp: 98.3 °F (36.8 °C)   TempSrc: Oral   SpO2: 95%   Weight: 85.8 kg (189 lb 2.5 oz)   Height: 1.803 m (5' 11\")                                              BP Readings from Last 3 Encounters:   01/09/24 (!) 172/83   01/04/24 132/82   12/13/23 (!) 160/80       NPO Status: Time of last liquid consumption: 0400                        Time of last solid consumption: 1800

## 2024-01-09 NOTE — BRIEF OP NOTE
Brief Postoperative Note      Patient: Keven Michele  YOB: 1945  MRN: [id number]    Date of Procedure: 1/9/2024    Pre-Op Diagnosis Codes:     * Primary osteoarthritis of left knee [M17.12]    Post-Op Diagnosis: Same       Procedure(s):  LEFT TOTAL KNEE REPLACEMENT (SPINAL ANESTHESIA WITH REGIONAL BLOCK) (FAST TRACK)    Surgeon(s):  Ramesh Hogue MD    Assistant:  * No surgical staff found *    Anesthesia: Spinal    Estimated Blood Loss (mL): 150cc    Complications: None    Specimens:   * No specimens in log *    Implants:  * No implants in log *      Drains: * No LDAs found *    Findings: advanced OA left knee      Electronically signed by Ramesh Hogue MD on 1/9/2024 at 6:01 AM

## 2024-02-05 RX ORDER — LISINOPRIL 20 MG/1
20 TABLET ORAL DAILY
Qty: 90 TABLET | Refills: 3 | Status: SHIPPED | OUTPATIENT
Start: 2024-02-05

## 2024-06-07 SDOH — HEALTH STABILITY: PHYSICAL HEALTH: ON AVERAGE, HOW MANY DAYS PER WEEK DO YOU ENGAGE IN MODERATE TO STRENUOUS EXERCISE (LIKE A BRISK WALK)?: 6 DAYS

## 2024-06-07 SDOH — HEALTH STABILITY: PHYSICAL HEALTH: ON AVERAGE, HOW MANY MINUTES DO YOU ENGAGE IN EXERCISE AT THIS LEVEL?: 30 MIN

## 2024-06-07 ASSESSMENT — PATIENT HEALTH QUESTIONNAIRE - PHQ9
SUM OF ALL RESPONSES TO PHQ QUESTIONS 1-9: 0
SUM OF ALL RESPONSES TO PHQ9 QUESTIONS 1 & 2: 0
SUM OF ALL RESPONSES TO PHQ QUESTIONS 1-9: 0
1. LITTLE INTEREST OR PLEASURE IN DOING THINGS: NOT AT ALL
2. FEELING DOWN, DEPRESSED OR HOPELESS: NOT AT ALL
SUM OF ALL RESPONSES TO PHQ QUESTIONS 1-9: 0
SUM OF ALL RESPONSES TO PHQ QUESTIONS 1-9: 0

## 2024-06-07 ASSESSMENT — LIFESTYLE VARIABLES
HOW OFTEN DO YOU HAVE SIX OR MORE DRINKS ON ONE OCCASION: 1
HOW MANY STANDARD DRINKS CONTAINING ALCOHOL DO YOU HAVE ON A TYPICAL DAY: 1
HOW MANY STANDARD DRINKS CONTAINING ALCOHOL DO YOU HAVE ON A TYPICAL DAY: 1 OR 2
HOW OFTEN DO YOU HAVE A DRINK CONTAINING ALCOHOL: 2-3 TIMES A WEEK
HOW OFTEN DO YOU HAVE A DRINK CONTAINING ALCOHOL: 4

## 2024-06-10 ENCOUNTER — OFFICE VISIT (OUTPATIENT)
Age: 79
End: 2024-06-10
Payer: MEDICARE

## 2024-06-10 VITALS
BODY MASS INDEX: 26.88 KG/M2 | OXYGEN SATURATION: 98 % | SYSTOLIC BLOOD PRESSURE: 122 MMHG | HEIGHT: 71 IN | DIASTOLIC BLOOD PRESSURE: 75 MMHG | TEMPERATURE: 97.9 F | RESPIRATION RATE: 16 BRPM | HEART RATE: 79 BPM | WEIGHT: 192 LBS

## 2024-06-10 DIAGNOSIS — Z12.5 ENCOUNTER FOR SCREENING FOR MALIGNANT NEOPLASM OF PROSTATE: ICD-10-CM

## 2024-06-10 DIAGNOSIS — Z00.01 ENCOUNTER FOR MEDICARE ANNUAL EXAMINATION WITH ABNORMAL FINDINGS: Primary | ICD-10-CM

## 2024-06-10 DIAGNOSIS — Z00.01 ENCOUNTER FOR MEDICARE ANNUAL EXAMINATION WITH ABNORMAL FINDINGS: ICD-10-CM

## 2024-06-10 DIAGNOSIS — I10 ESSENTIAL HYPERTENSION, BENIGN: ICD-10-CM

## 2024-06-10 DIAGNOSIS — Z96.652 S/P TOTAL KNEE ARTHROPLASTY, LEFT: ICD-10-CM

## 2024-06-10 PROCEDURE — G8427 DOCREV CUR MEDS BY ELIG CLIN: HCPCS | Performed by: FAMILY MEDICINE

## 2024-06-10 PROCEDURE — 3074F SYST BP LT 130 MM HG: CPT | Performed by: FAMILY MEDICINE

## 2024-06-10 PROCEDURE — G0439 PPPS, SUBSEQ VISIT: HCPCS | Performed by: FAMILY MEDICINE

## 2024-06-10 PROCEDURE — 99213 OFFICE O/P EST LOW 20 MIN: CPT | Performed by: FAMILY MEDICINE

## 2024-06-10 PROCEDURE — 1036F TOBACCO NON-USER: CPT | Performed by: FAMILY MEDICINE

## 2024-06-10 PROCEDURE — 3078F DIAST BP <80 MM HG: CPT | Performed by: FAMILY MEDICINE

## 2024-06-10 PROCEDURE — G8419 CALC BMI OUT NRM PARAM NOF/U: HCPCS | Performed by: FAMILY MEDICINE

## 2024-06-10 PROCEDURE — 1123F ACP DISCUSS/DSCN MKR DOCD: CPT | Performed by: FAMILY MEDICINE

## 2024-06-10 RX ORDER — LISINOPRIL 40 MG/1
40 TABLET ORAL DAILY
Qty: 90 TABLET | Refills: 3 | Status: SHIPPED | OUTPATIENT
Start: 2024-06-10

## 2024-06-10 NOTE — PROGRESS NOTES
Chief Complaint   Patient presents with    Medicare AWV    Hypertension    Lab Collection     \"Have you been to the ER, urgent care clinic since your last visit?  Hospitalized since your last visit?\"    NO    “Have you seen or consulted any other health care providers outside of [facility] since your last visit?”    NO          Click Here for Release of Records Request    
and importance of compliance with the treatment plan as well as documenting on the day of the visit.      Time listed above was time used outside of routine V workup and care.    Encounter for Medicare annual examination with abnormal findings  -     Comprehensive Metabolic Panel; Future  -     Lipid Panel; Future  -     CBC with Auto Differential; Future  -     PSA Screening; Future  Essential hypertension, benign  -     Comprehensive Metabolic Panel; Future  -     Lipid Panel; Future  S/P total knee arthroplasty, left  Encounter for screening for malignant neoplasm of prostate  -     PSA Screening; Future    Recommendations for Preventive Services Due: see orders and patient instructions/AVS.    Recommended screening schedule for the next 5-10 years is provided to the patient in written form: see Patient Instructions/AVS.    No follow-ups on file.     Subjective   The following acute and/or chronic problems were also addressed today:  See above    See above for detailed subjective for diagnosis list 2 thru...Each area addressed and plan listed above.    Patient's complete Health Risk Assessment and screening values have been reviewed and are found in Flowsheets. The following problems were reviewed today and where indicated follow up appointments were made and/or referrals ordered.    Positive Risk Factor Screenings with Interventions:          Interventions:  Patient declined any further intervention or treatment    6 8 she                Inactivity Interventions:  See AVS for additional education material  Obesity Interventions:  See AVS for additional education material              Interventions:  Patient declines any further evaluation or treatment    Vision Screen:  Do you have difficulty driving, watching TV, or doing any of your daily activities because of your eyesight?: No  Have you had an eye exam within the past year?: (!) No  No results found.    Interventions:   Patient declines any further

## 2024-06-10 NOTE — PATIENT INSTRUCTIONS
Chest pain or pressure, or a strange feeling in the chest.     Sweating.     Shortness of breath.     Pain, pressure, or a strange feeling in the back, neck, jaw, or upper belly or in one or both shoulders or arms.     Lightheadedness or sudden weakness.     A fast or irregular heartbeat.   After you call 911, the  may tell you to chew 1 adult-strength or 2 to 4 low-dose aspirin. Wait for an ambulance. Do not try to drive yourself.  Watch closely for changes in your health, and be sure to contact your doctor if you have any problems.  Where can you learn more?  Go to https://www.[employer] creations.net/patientEd and enter F075 to learn more about \"A Healthy Heart: Care Instructions.\"  Current as of: June 24, 2023  Content Version: 14.1  © 4137-5083 [employer].   Care instructions adapted under license by [employer]. If you have questions about a medical condition or this instruction, always ask your healthcare professional. [employer] disclaims any warranty or liability for your use of this information.      Personalized Preventive Plan for Keven Michele - 6/10/2024  Medicare offers a range of preventive health benefits. Some of the tests and screenings are paid in full while other may be subject to a deductible, co-insurance, and/or copay.    Some of these benefits include a comprehensive review of your medical history including lifestyle, illnesses that may run in your family, and various assessments and screenings as appropriate.    After reviewing your medical record and screening and assessments performed today your provider may have ordered immunizations, labs, imaging, and/or referrals for you.  A list of these orders (if applicable) as well as your Preventive Care list are included within your After Visit Summary for your review.    Other Preventive Recommendations:    A preventive eye exam performed by an eye specialist is recommended every 1-2 years to screen for glaucoma;

## 2024-06-11 LAB
ALBUMIN SERPL-MCNC: 4.2 G/DL (ref 3.5–5)
ALBUMIN/GLOB SERPL: 1.4 (ref 1.1–2.2)
ALP SERPL-CCNC: 68 U/L (ref 45–117)
ALT SERPL-CCNC: 24 U/L (ref 12–78)
ANION GAP SERPL CALC-SCNC: 5 MMOL/L (ref 5–15)
AST SERPL-CCNC: 14 U/L (ref 15–37)
BASOPHILS # BLD: 0.1 K/UL (ref 0–0.1)
BASOPHILS NFR BLD: 1 % (ref 0–1)
BILIRUB SERPL-MCNC: 0.8 MG/DL (ref 0.2–1)
BUN SERPL-MCNC: 33 MG/DL (ref 6–20)
BUN/CREAT SERPL: 20 (ref 12–20)
CALCIUM SERPL-MCNC: 10.1 MG/DL (ref 8.5–10.1)
CHLORIDE SERPL-SCNC: 107 MMOL/L (ref 97–108)
CHOLEST SERPL-MCNC: 223 MG/DL
CO2 SERPL-SCNC: 26 MMOL/L (ref 21–32)
CREAT SERPL-MCNC: 1.69 MG/DL (ref 0.7–1.3)
DIFFERENTIAL METHOD BLD: NORMAL
EOSINOPHIL # BLD: 0.2 K/UL (ref 0–0.4)
EOSINOPHIL NFR BLD: 2 % (ref 0–7)
ERYTHROCYTE [DISTWIDTH] IN BLOOD BY AUTOMATED COUNT: 13.4 % (ref 11.5–14.5)
GLOBULIN SER CALC-MCNC: 3.1 G/DL (ref 2–4)
GLUCOSE SERPL-MCNC: 107 MG/DL (ref 65–100)
HCT VFR BLD AUTO: 45.4 % (ref 36.6–50.3)
HDLC SERPL-MCNC: 60 MG/DL
HDLC SERPL: 3.7 (ref 0–5)
HGB BLD-MCNC: 14.7 G/DL (ref 12.1–17)
IMM GRANULOCYTES # BLD AUTO: 0 K/UL (ref 0–0.04)
IMM GRANULOCYTES NFR BLD AUTO: 0 % (ref 0–0.5)
LDLC SERPL CALC-MCNC: 145 MG/DL (ref 0–100)
LYMPHOCYTES # BLD: 1.9 K/UL (ref 0.8–3.5)
LYMPHOCYTES NFR BLD: 18 % (ref 12–49)
MCH RBC QN AUTO: 31.8 PG (ref 26–34)
MCHC RBC AUTO-ENTMCNC: 32.4 G/DL (ref 30–36.5)
MCV RBC AUTO: 98.3 FL (ref 80–99)
MONOCYTES # BLD: 0.8 K/UL (ref 0–1)
MONOCYTES NFR BLD: 7 % (ref 5–13)
NEUTS SEG # BLD: 7.7 K/UL (ref 1.8–8)
NEUTS SEG NFR BLD: 72 % (ref 32–75)
NRBC # BLD: 0 K/UL (ref 0–0.01)
NRBC BLD-RTO: 0 PER 100 WBC
PLATELET # BLD AUTO: 267 K/UL (ref 150–400)
PMV BLD AUTO: 10 FL (ref 8.9–12.9)
POTASSIUM SERPL-SCNC: 4.6 MMOL/L (ref 3.5–5.1)
PROT SERPL-MCNC: 7.3 G/DL (ref 6.4–8.2)
PSA SERPL-MCNC: 3.8 NG/ML (ref 0.01–4)
RBC # BLD AUTO: 4.62 M/UL (ref 4.1–5.7)
SODIUM SERPL-SCNC: 138 MMOL/L (ref 136–145)
TRIGL SERPL-MCNC: 90 MG/DL
VLDLC SERPL CALC-MCNC: 18 MG/DL
WBC # BLD AUTO: 10.6 K/UL (ref 4.1–11.1)

## 2024-07-29 RX ORDER — ALLOPURINOL 100 MG/1
TABLET ORAL
Qty: 90 TABLET | Refills: 3 | Status: SHIPPED | OUTPATIENT
Start: 2024-07-29

## 2024-10-23 RX ORDER — DICLOFENAC SODIUM 75 MG/1
TABLET, DELAYED RELEASE ORAL
Qty: 180 TABLET | Refills: 3 | Status: SHIPPED | OUTPATIENT
Start: 2024-10-23

## 2025-03-31 RX ORDER — LISINOPRIL 40 MG/1
40 TABLET ORAL DAILY
Qty: 90 TABLET | Refills: 3 | Status: SHIPPED | OUTPATIENT
Start: 2025-03-31

## 2025-04-01 ENCOUNTER — TELEPHONE (OUTPATIENT)
Facility: CLINIC | Age: 80
End: 2025-04-01

## 2025-04-07 ENCOUNTER — TELEPHONE (OUTPATIENT)
Facility: CLINIC | Age: 80
End: 2025-04-07

## 2025-07-01 ENCOUNTER — OFFICE VISIT (OUTPATIENT)
Facility: CLINIC | Age: 80
End: 2025-07-01
Payer: MEDICARE

## 2025-07-01 VITALS
HEIGHT: 71 IN | WEIGHT: 197 LBS | TEMPERATURE: 97.4 F | DIASTOLIC BLOOD PRESSURE: 81 MMHG | HEART RATE: 57 BPM | OXYGEN SATURATION: 98 % | BODY MASS INDEX: 27.58 KG/M2 | RESPIRATION RATE: 16 BRPM | SYSTOLIC BLOOD PRESSURE: 135 MMHG

## 2025-07-01 DIAGNOSIS — N18.30 STAGE 3 CHRONIC KIDNEY DISEASE, UNSPECIFIED WHETHER STAGE 3A OR 3B CKD (HCC): ICD-10-CM

## 2025-07-01 DIAGNOSIS — Z12.5 ENCOUNTER FOR SCREENING FOR MALIGNANT NEOPLASM OF PROSTATE: ICD-10-CM

## 2025-07-01 DIAGNOSIS — I10 ESSENTIAL HYPERTENSION, BENIGN: ICD-10-CM

## 2025-07-01 DIAGNOSIS — R73.9 ELEVATED BLOOD SUGAR: ICD-10-CM

## 2025-07-01 DIAGNOSIS — Z00.00 MEDICARE ANNUAL WELLNESS VISIT, SUBSEQUENT: Primary | ICD-10-CM

## 2025-07-01 DIAGNOSIS — E78.00 ELEVATED LDL CHOLESTEROL LEVEL: ICD-10-CM

## 2025-07-01 DIAGNOSIS — Z00.00 MEDICARE ANNUAL WELLNESS VISIT, SUBSEQUENT: ICD-10-CM

## 2025-07-01 PROCEDURE — 3075F SYST BP GE 130 - 139MM HG: CPT | Performed by: FAMILY MEDICINE

## 2025-07-01 PROCEDURE — 1123F ACP DISCUSS/DSCN MKR DOCD: CPT | Performed by: FAMILY MEDICINE

## 2025-07-01 PROCEDURE — 1126F AMNT PAIN NOTED NONE PRSNT: CPT | Performed by: FAMILY MEDICINE

## 2025-07-01 PROCEDURE — G0439 PPPS, SUBSEQ VISIT: HCPCS | Performed by: FAMILY MEDICINE

## 2025-07-01 PROCEDURE — 99214 OFFICE O/P EST MOD 30 MIN: CPT | Performed by: FAMILY MEDICINE

## 2025-07-01 PROCEDURE — 3079F DIAST BP 80-89 MM HG: CPT | Performed by: FAMILY MEDICINE

## 2025-07-01 PROCEDURE — 1160F RVW MEDS BY RX/DR IN RCRD: CPT | Performed by: FAMILY MEDICINE

## 2025-07-01 PROCEDURE — 1159F MED LIST DOCD IN RCRD: CPT | Performed by: FAMILY MEDICINE

## 2025-07-01 RX ORDER — LISINOPRIL 40 MG/1
40 TABLET ORAL DAILY
Qty: 90 TABLET | Refills: 3 | Status: SHIPPED | OUTPATIENT
Start: 2025-07-01

## 2025-07-01 SDOH — ECONOMIC STABILITY: FOOD INSECURITY: WITHIN THE PAST 12 MONTHS, THE FOOD YOU BOUGHT JUST DIDN'T LAST AND YOU DIDN'T HAVE MONEY TO GET MORE.: NEVER TRUE

## 2025-07-01 SDOH — ECONOMIC STABILITY: FOOD INSECURITY: WITHIN THE PAST 12 MONTHS, YOU WORRIED THAT YOUR FOOD WOULD RUN OUT BEFORE YOU GOT MONEY TO BUY MORE.: NEVER TRUE

## 2025-07-01 ASSESSMENT — LIFESTYLE VARIABLES
HOW MANY STANDARD DRINKS CONTAINING ALCOHOL DO YOU HAVE ON A TYPICAL DAY: 1 OR 2
HOW OFTEN DO YOU HAVE A DRINK CONTAINING ALCOHOL: 2-3 TIMES A WEEK

## 2025-07-01 ASSESSMENT — PATIENT HEALTH QUESTIONNAIRE - PHQ9
SUM OF ALL RESPONSES TO PHQ QUESTIONS 1-9: 0
1. LITTLE INTEREST OR PLEASURE IN DOING THINGS: NOT AT ALL
2. FEELING DOWN, DEPRESSED OR HOPELESS: NOT AT ALL
SUM OF ALL RESPONSES TO PHQ QUESTIONS 1-9: 0

## 2025-07-01 NOTE — PATIENT INSTRUCTIONS
receiving TTY. The message is shown on a monitor.  Use text messaging, social media, and email if it is hard for you to communicate by telephone.  Try to learn a listening technique called speechreading. It is not lipreading. You pay attention to people's gestures, expressions, posture, and tone of voice. These clues can help you understand what a person is saying. Face the person you are talking to, and have them face you. Make sure the lighting is good. You need to see the other person's face clearly.  Think about counseling if you need help to adjust to your hearing loss.  When should you call for help?  Watch closely for changes in your health, and be sure to contact your doctor if:    You think your hearing is getting worse.     You have new symptoms, such as dizziness or nausea.   Where can you learn more?  Go to https://www.[employer].net/patientEd and enter R798 to learn more about \"Hearing Loss: Care Instructions.\"  Current as of: October 27, 2024  Content Version: 14.5  © 2024-2025 [employer].   Care instructions adapted under license by [employer]. If you have questions about a medical condition or this instruction, always ask your healthcare professional. [employer] disclaims any warranty or liability for your use of this information.         Learning About Vision Tests  What are vision tests?     The four most common vision tests are visual acuity tests, refraction, visual field tests, and color vision tests.  Visual acuity (sharpness) tests  These tests are used:  To see if you need glasses or contact lenses.  To monitor an eye problem.  To check an eye injury.  Visual acuity tests are done as part of routine exams. You may also have this test when you get your 's license or apply for some types of jobs.  Visual field tests  These tests are used:  To check for vision loss in any area of your range of vision.  To screen for certain eye diseases.  To look for nerve damage

## 2025-07-01 NOTE — PROGRESS NOTES
Chief Complaint   Patient presents with    Medicare AWV     Patient presents in office today for AMW visit and fasting labs.  No concerns.      Have you been to the ER, urgent care clinic since your last visit?  Hospitalized since your last visit?   NO    Have you seen or consulted any other health care providers outside our system since your last visit?   NO         
  indomethacin (INDOCIN) 25 MG capsule TAKE 1 CAPSULE THREE TIMES DAILY  Patient not taking: Reported on 7/1/2025  Automatic Reconciliation, Ar       CareTeam (Including outside providers/suppliers regularly involved in providing care):   Patient Care Team:  Aneudy Benton MD as PCP - General  Aneudy Benton MD as PCP - Empaneled Provider     Recommendations for Preventive Services Due: see orders and patient instructions/AVS.  Recommended screening schedule for the next 5-10 years is provided to the patient in written form: see Patient Instructions/AVS.     Reviewed and updated this visit:  Tobacco  Allergies  Meds  Problems  Med Hx  Surg Hx  Fam Hx  Sexual   Hx                  The patient (or guardian, if applicable) and other individuals in attendance with the patient were advised that Artificial Intelligence will be utilized during this visit to record and process the conversation to generate a clinical note. The patient (or guardian, if applicable) and other individuals in attendance at the appointment consented to the use of AI, including the recording.

## 2025-07-07 LAB
ALBUMIN SERPL-MCNC: 4.3 G/DL (ref 3.7–4.7)
ALP SERPL-CCNC: 67 IU/L (ref 44–121)
ALT SERPL-CCNC: 17 IU/L (ref 0–44)
AST SERPL-CCNC: 16 IU/L (ref 0–40)
BASOPHILS # BLD AUTO: 0 X10E3/UL (ref 0–0.2)
BASOPHILS NFR BLD AUTO: 1 %
BILIRUB SERPL-MCNC: 0.4 MG/DL (ref 0–1.2)
BUN SERPL-MCNC: 33 MG/DL (ref 8–27)
BUN/CREAT SERPL: 19 (ref 10–24)
CALCIUM SERPL-MCNC: 9.4 MG/DL (ref 8.6–10.2)
CHLORIDE SERPL-SCNC: 103 MMOL/L (ref 96–106)
CHOLEST SERPL-MCNC: 206 MG/DL (ref 100–199)
CO2 SERPL-SCNC: 21 MMOL/L (ref 20–29)
CREAT SERPL-MCNC: 1.78 MG/DL (ref 0.76–1.27)
EGFRCR SERPLBLD CKD-EPI 2021: 38 ML/MIN/1.73
EOSINOPHIL # BLD AUTO: 0.1 X10E3/UL (ref 0–0.4)
EOSINOPHIL NFR BLD AUTO: 2 %
ERYTHROCYTE [DISTWIDTH] IN BLOOD BY AUTOMATED COUNT: 13.1 % (ref 11.6–15.4)
EST. AVERAGE GLUCOSE BLD GHB EST-MCNC: 108 MG/DL
GLOBULIN SER CALC-MCNC: 2.6 G/DL (ref 1.5–4.5)
GLUCOSE SERPL-MCNC: 97 MG/DL (ref 70–99)
HBA1C MFR BLD: 5.4 %HB
HCT VFR BLD AUTO: 44.7 % (ref 37.5–51)
HDLC SERPL-MCNC: 45 MG/DL
HGB BLD-MCNC: 14.6 G/DL (ref 13–17.7)
IMM GRANULOCYTES # BLD AUTO: 0 X10E3/UL (ref 0–0.1)
IMM GRANULOCYTES NFR BLD AUTO: 0 %
LDLC SERPL CALC-MCNC: 151 MG/DL (ref 0–99)
LYMPHOCYTES # BLD AUTO: 1.6 X10E3/UL (ref 0.7–3.1)
LYMPHOCYTES NFR BLD AUTO: 24 %
MCH RBC QN AUTO: 33 PG (ref 26.6–33)
MCHC RBC AUTO-ENTMCNC: 32.7 G/DL (ref 31.5–35.7)
MCV RBC AUTO: 101 FL (ref 79–97)
MONOCYTES # BLD AUTO: 0.5 X10E3/UL (ref 0.1–0.9)
MONOCYTES NFR BLD AUTO: 7 %
NEUTROPHILS # BLD AUTO: 4.7 X10E3/UL (ref 1.4–7)
NEUTROPHILS NFR BLD AUTO: 66 %
PLATELET # BLD AUTO: 238 X10E3/UL (ref 150–450)
POTASSIUM SERPL-SCNC: 5.6 MMOL/L (ref 3.5–5.2)
PROT SERPL-MCNC: 6.9 G/DL (ref 6–8.5)
PSA SERPL-MCNC: 3.9 NG/ML (ref 0–4)
RBC # BLD AUTO: 4.43 X10E6/UL (ref 4.14–5.8)
SODIUM SERPL-SCNC: 138 MMOL/L (ref 134–144)
TRIGL SERPL-MCNC: 58 MG/DL (ref 0–149)
TSH SERPL DL<=0.005 MIU/L-ACNC: 1.21 UIU/ML (ref 0.45–4.5)
VLDLC SERPL CALC-MCNC: 10 MG/DL (ref 5–40)
WBC # BLD AUTO: 7 X10E3/UL (ref 3.4–10.8)

## 2025-07-08 ENCOUNTER — RESULTS FOLLOW-UP (OUTPATIENT)
Facility: CLINIC | Age: 80
End: 2025-07-08

## 2025-07-25 NOTE — TELEPHONE ENCOUNTER
Patient calls in asking for a refill of his ALLOPURINOL 100MG to his mail deliver pharmacy.  Request was sent to the provider.

## 2025-07-28 RX ORDER — ALLOPURINOL 100 MG/1
100 TABLET ORAL DAILY
Qty: 90 TABLET | Refills: 3 | Status: SHIPPED | OUTPATIENT
Start: 2025-07-28

## (undated) DEVICE — ATTUNE SOLO PINNING SYSTEM

## (undated) DEVICE — SOLUTION SCRB 4% CHG RED ANTIMIC SKIN CLN PREOPERATIVE DISP

## (undated) DEVICE — GLOVE SURG SZ 8 L12IN FNGR THK79MIL GRN LTX FREE

## (undated) DEVICE — HOOD SURG T7

## (undated) DEVICE — DRAPE,EXTREMITY,89X128,STERILE: Brand: MEDLINE

## (undated) DEVICE — 450 ML BOTTLE OF 0.05% CHLORHEXIDINE GLUCONATE IN 99.95% STERILE WATER FOR IRRIGATION, USP AND APPLICATOR.: Brand: IRRISEPT ANTIMICROBIAL WOUND LAVAGE

## (undated) DEVICE — SYRINGE MED 30ML STD CLR PLAS LUERLOCK TIP N CTRL DISP

## (undated) DEVICE — ELECTRODE BLDE L4IN NONINSULATED EDGE

## (undated) DEVICE — GLOVE ORTHO 7 1/2   MSG9475

## (undated) DEVICE — FOAM BUMP, LARGE: Brand: MEDLINE INDUSTRIES, INC.

## (undated) DEVICE — DRAPE,REIN 53X77,STERILE: Brand: MEDLINE

## (undated) DEVICE — SOLUTION IRRIG 3000ML 0.9% SOD CHL USP UROMATIC PLAS CONT

## (undated) DEVICE — SUTURE STRATAFIX SPRL MCRYL + 3 0 SGL ARMED PS 1 24 IN LEN SXMP1B103

## (undated) DEVICE — Device: Brand: JELCO

## (undated) DEVICE — SPONGE GZ W4XL4IN COT 12 PLY TYP VII WVN C FLD DSGN STERILE

## (undated) DEVICE — 4-PORT MANIFOLD: Brand: NEPTUNE 2

## (undated) DEVICE — GLOVE ORTHO 8   MSG9480

## (undated) DEVICE — ZIMMER® STERILE DISPOSABLE TOURNIQUET CUFF WITH PROTECTIVE SLEEVE AND PLC, DUAL PORT, SINGLE BLADDER, 34 IN. (86 CM)

## (undated) DEVICE — TOTAL JOINT-SFMC: Brand: MEDLINE INDUSTRIES, INC.

## (undated) DEVICE — CEMENT MIXING SYSTEM WITH FEMORAL BREAKWAY NOZZLE: Brand: REVOLUTION

## (undated) DEVICE — SUTURE VCRL SZ 2-0 L36IN ABSRB UD L36MM CT-1 1/2 CIR J945H

## (undated) DEVICE — DRESSING ANTIMIC FOAM MEPILEX BORD POSTOP AG PROD SZ 4X12 IN

## (undated) DEVICE — STRYKER PERFORMANCE SERIES SAGITTAL BLADE: Brand: STRYKER PERFORMANCE SERIES

## (undated) DEVICE — SOLUTION IRRIG 1000ML STRL H2O USP PLAS POUR BTL

## (undated) DEVICE — YANKAUER,OPEN TIP,W/O VENT,STERILE: Brand: MEDLINE INDUSTRIES, INC.

## (undated) DEVICE — DRESSING FOAM 4X8IN DISP POSTOP MEPILEX BORD AG

## (undated) DEVICE — PENCIL SMK EVAC ALL IN 1 DSGN ENH VISIBILITY IMPROVED AIR

## (undated) DEVICE — SUTURE MNFLMNT TPR 36 MM 1/2 TA PN FLT NDLE PLDXNNE VLT

## (undated) DEVICE — SUTURE STRATAFIX SPRL SZ 1 L14IN ABSRB VLT L48CM CTX 1/2 SXPD2B405

## (undated) DEVICE — SUTURE VCRL + SZ 1-0 L36IN ABSRB UD CTX 1/2 CIR TAPR PNT VCP977H